# Patient Record
Sex: FEMALE | Race: WHITE
[De-identification: names, ages, dates, MRNs, and addresses within clinical notes are randomized per-mention and may not be internally consistent; named-entity substitution may affect disease eponyms.]

---

## 2018-10-13 ENCOUNTER — HOSPITAL ENCOUNTER (EMERGENCY)
Dept: HOSPITAL 62 - ER | Age: 23
LOS: 1 days | Discharge: PSYCHIATRIC OR SUBSTANCE USE FACILITY | End: 2018-10-14
Payer: OTHER GOVERNMENT

## 2018-10-13 DIAGNOSIS — Z87.891: ICD-10-CM

## 2018-10-13 DIAGNOSIS — R45.851: ICD-10-CM
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
## 2018-10-13 DIAGNOSIS — S51.811A: ICD-10-CM

## 2018-10-13 DIAGNOSIS — Z23: ICD-10-CM

## 2018-10-13 DIAGNOSIS — F32.9: Primary | ICD-10-CM

## 2018-10-13 LAB
ADD MANUAL DIFF: NO
ALBUMIN SERPL-MCNC: 5.2 G/DL (ref 3.5–5)
ALP SERPL-CCNC: 46 U/L (ref 38–126)
ALT SERPL-CCNC: 27 U/L (ref 9–52)
ANION GAP SERPL CALC-SCNC: 15 MMOL/L (ref 5–19)
APAP SERPL-MCNC: < 10 UG/ML (ref 10–30)
APPEARANCE UR: CLEAR
APTT PPP: COLORLESS S
AST SERPL-CCNC: 12 U/L (ref 14–36)
BARBITURATES UR QL SCN: NEGATIVE
BASOPHILS # BLD AUTO: 0 10^3/UL (ref 0–0.2)
BASOPHILS NFR BLD AUTO: 0.8 % (ref 0–2)
BILIRUB DIRECT SERPL-MCNC: 0.2 MG/DL (ref 0–0.4)
BILIRUB SERPL-MCNC: 0.6 MG/DL (ref 0.2–1.3)
BILIRUB UR QL STRIP: NEGATIVE
BUN SERPL-MCNC: 7 MG/DL (ref 7–20)
CALCIUM: 9.5 MG/DL (ref 8.4–10.2)
CHLORIDE SERPL-SCNC: 108 MMOL/L (ref 98–107)
CO2 SERPL-SCNC: 23 MMOL/L (ref 22–30)
EOSINOPHIL # BLD AUTO: 0.1 10^3/UL (ref 0–0.6)
EOSINOPHIL NFR BLD AUTO: 1.8 % (ref 0–6)
ERYTHROCYTE [DISTWIDTH] IN BLOOD BY AUTOMATED COUNT: 13.1 % (ref 11.5–14)
ETHANOL SERPL-MCNC: 212 MG/DL
GLUCOSE SERPL-MCNC: 103 MG/DL (ref 75–110)
GLUCOSE UR STRIP-MCNC: NEGATIVE MG/DL
HCT VFR BLD CALC: 40.8 % (ref 36–47)
HGB BLD-MCNC: 14 G/DL (ref 12–15.5)
KETONES UR STRIP-MCNC: NEGATIVE MG/DL
LYMPHOCYTES # BLD AUTO: 2.5 10^3/UL (ref 0.5–4.7)
LYMPHOCYTES NFR BLD AUTO: 40.2 % (ref 13–45)
MCH RBC QN AUTO: 30.4 PG (ref 27–33.4)
MCHC RBC AUTO-ENTMCNC: 34.3 G/DL (ref 32–36)
MCV RBC AUTO: 89 FL (ref 80–97)
METHADONE UR QL SCN: NEGATIVE
MONOCYTES # BLD AUTO: 0.4 10^3/UL (ref 0.1–1.4)
MONOCYTES NFR BLD AUTO: 6.8 % (ref 3–13)
NEUTROPHILS # BLD AUTO: 3.2 10^3/UL (ref 1.7–8.2)
NEUTS SEG NFR BLD AUTO: 50.4 % (ref 42–78)
NITRITE UR QL STRIP: NEGATIVE
PCP UR QL SCN: NEGATIVE
PH UR STRIP: 6 [PH] (ref 5–9)
PLATELET # BLD: 282 10^3/UL (ref 150–450)
POTASSIUM SERPL-SCNC: 4.2 MMOL/L (ref 3.6–5)
PROT SERPL-MCNC: 8.7 G/DL (ref 6.3–8.2)
PROT UR STRIP-MCNC: NEGATIVE MG/DL
RBC # BLD AUTO: 4.61 10^6/UL (ref 3.72–5.28)
SALICYLATES SERPL-MCNC: < 1 MG/DL (ref 2–20)
SODIUM SERPL-SCNC: 146.2 MMOL/L (ref 137–145)
SP GR UR STRIP: 1
TOTAL CELLS COUNTED % (AUTO): 100 %
URINE AMPHETAMINES SCREEN: NEGATIVE
URINE BENZODIAZEPINES SCREEN: NEGATIVE
URINE COCAINE SCREEN: NEGATIVE
URINE MARIJUANA (THC) SCREEN: NEGATIVE
UROBILINOGEN UR-MCNC: NEGATIVE MG/DL (ref ?–2)
WBC # BLD AUTO: 6.3 10^3/UL (ref 4–10.5)

## 2018-10-13 PROCEDURE — 99285 EMERGENCY DEPT VISIT HI MDM: CPT

## 2018-10-13 PROCEDURE — 85025 COMPLETE CBC W/AUTO DIFF WBC: CPT

## 2018-10-13 PROCEDURE — 36415 COLL VENOUS BLD VENIPUNCTURE: CPT

## 2018-10-13 PROCEDURE — 0HQDXZZ REPAIR RIGHT LOWER ARM SKIN, EXTERNAL APPROACH: ICD-10-PCS

## 2018-10-13 PROCEDURE — 81025 URINE PREGNANCY TEST: CPT

## 2018-10-13 PROCEDURE — 93010 ELECTROCARDIOGRAM REPORT: CPT

## 2018-10-13 PROCEDURE — 81001 URINALYSIS AUTO W/SCOPE: CPT

## 2018-10-13 PROCEDURE — 93005 ELECTROCARDIOGRAM TRACING: CPT

## 2018-10-13 PROCEDURE — 80053 COMPREHEN METABOLIC PANEL: CPT

## 2018-10-13 PROCEDURE — 90715 TDAP VACCINE 7 YRS/> IM: CPT

## 2018-10-13 PROCEDURE — 12002 RPR S/N/AX/GEN/TRNK2.6-7.5CM: CPT

## 2018-10-13 PROCEDURE — 80307 DRUG TEST PRSMV CHEM ANLYZR: CPT

## 2018-10-13 NOTE — ER DOCUMENT REPORT
Doctor's Note


Notes: 





10/13/18 09:56


23-year-old female presenting with suicidal ideations and cutting her wrist.  

Patient has stress secondary to going through a divorce.  Patient was 

[facility].  Patient has no 

psychiatrist and does not have any formal psychiatric diagnosis according to 

the patient's report.  Vital signs are stable.  Labs as recorded.  Awaiting 

psychiatry evaluation.





10/13/18 10:12


I have added a pregnancy test and have had a long discussion with the patient 

and the friend in the room.  Patient does feel comfortable going home as she 

states she does have a psychology appointment on Wednesday but still does not 

trust herself feeling safe to not inflict self-harm.  She denies any auditory 

visual hallucinations.  Labs as recorded.  Vital signs are stable.

## 2018-10-13 NOTE — ER DOCUMENT REPORT
ED General





- General


Chief Complaint: Suicidal Ideation


Stated Complaint: SUICIDAL IDEATION


Time Seen by Provider: 10/13/18 01:02


Mode of Arrival: Ambulatory


TRAVEL OUTSIDE OF THE [place] IN LAST 30 DAYS: No





- HPI


Patient complains to provider of: self injury


Onset: Other - This is a 23-year-old female that presents for evaluation of 

depression as well as her right wrist.  She notes that she has been wanting to 

harm herself recently because she is going through divorce and feeling very 

stressed.  She has had an attempt to harm herself in the past when she was 

stressed. Is not currently on any medications is never been done before, was 

seen a week prior and told that she should pursue outpatient treatment was 

discharged from [facility]. She denies any desire to harm anyone else, any 

hallucinations.





- Related Data


Allergies/Adverse Reactions: 


 





No Known Allergies Allergy (Unverified 10/13/18 01:10)


 











Past Medical History





- General


Information source: Patient





- Social History


Smoking Status: Former Smoker


Family History: None


Psychiatric Medical History: Reports: Hx Depression





Review of Systems





- Review of Systems


-: Yes All other systems reviewed and negative





Physical Exam





- Vital signs


Vitals: 


 











Pulse


 


 99 


 


 10/13/18 01:10














- General


General appearance: Appears well


In distress: None





- HEENT


Head: Normocephalic


Eyes: Normal


Conjunctiva: Normal


Cornea: Normal


Extraocular movements intact: Yes


Eyelashes: Normal


Pupils: PERRL





- Respiratory


Respiratory status: No respiratory distress


Chest status: Nontender


Breath sounds: Normal


Chest palpation: Normal





- Cardiovascular


Rhythm: Regular


Heart sounds: Normal auscultation


Murmur: No





- Abdominal


Inspection: Normal


Distension: No distension


Tenderness: Nontender





- Back


Back: Normal





- Extremities


General lower extremity: Normal inspection, Nontender, Normal ROM, Normal 

strength


Arm: Other - The upper extremities are symmetric, there is normal range of 

motion at the shoulders, elbows, wrists. There is a linear horizontal 

laceration extending against the dorsal aspect of the right forearm into the 

subcutaneous tissue, no appreciable underlying tendon injury, no appreciable 

muscular involvement





- Neurological


Neuro grossly intact: Yes


Cognition: Normal


Orientation: AAOx4


Burney Coma Scale Eye Opening: Spontaneous


Bebo Coma Scale Verbal: Oriented


Bebo Coma Scale Motor: Obeys Commands


Burney Coma Scale Total: 15


Speech: Normal


Cranial nerves: Normal


Cerebellar coordination: Normal


Motor strength normal: LUE, RUE, LLE, RLE





- Psychological


Associated symptoms: Normal affect





Course





- Re-evaluation


Re-evalutation: 





10/13/18 06:26


23-year-old female who cut her right wrist this evening in an attempt to harm 

herself.


Has a history of depression which is been untreated in the past, notes that she 

is got increased stress as a relation of going through divorce.  Denies any 

medical problems, any other health problems, any medication use drug use or 

otherwise.


Patient is a 4 cm laceration over the right forearm, will primarily repair 

this.  Will update patient's tetanus.  Will contact psychiatry for evaluation 

of this patient.


We will defer disposition determination through mental health services.


At this time the patient has been medically cleared, she is clinically sober.





- Vital Signs


Vital signs: 


 











Temp Pulse Resp BP Pulse Ox


 


    99          


 


    10/13/18 01:10         














- Laboratory


Result Diagrams: 


 10/13/18 01:35





 10/13/18 01:35


Laboratory results interpreted by me: 


 











  10/13/18





  01:35


 


Sodium  146.2 H


 


Chloride  108 H


 


AST  12 L


 


Total Protein  8.7 H


 


Albumin  5.2 H


 


Salicylates  < 1.0 L


 


Acetaminophen  < 10 L














Procedures





- Laceration/Wound Repair


  ** Right Volar Arm


Wound length (cm): 4


Wound's Depth, Shape: Superficial


Laceration pre-procedure: Sterile PPE donned


Anesthetic type: 1% Lidocaine


Volume Anesthetic (mLs): 5


Wound explored: Clean


Irrigated w/ Saline (mLs): 500


Wound Debrided: Minimal


Wound Repaired With: Sutures


Suture Size/Type: 4:0, Prolene


Number of Sutures: 3


Layer Closure?: No





Discharge





- Discharge


Clinical Impression: 


 Self-injurious behavior

## 2018-10-13 NOTE — PSYCHOLOGICAL NOTE
Psych Note





- Psych Note


Psych Note: 


Reason for consult: suicidal ideation, depression


Consent for permissions: Aminata, personal friend [phone]





Pt arrives to ED with c/o depression. Pt has cut her right forearm prior to 

arrival. Pt was seen at [facility] and d/c home yesterday for outpatient 

care. Pt has appointment with [facility] for Wed. Pt states 

 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 No specific incident has 

occured to cause her to harm herself. Pt reports history of same. 





Patient states that she "self inflicted" a wound to her right arm last night. 

Patient disclosed that she does not feel sad but impulsive and that is why she 

chose to cut her self. Patient states that she was not trying to kill her self 

but just release the anger. Patient says that "she just got the thought in her 

head" and cut herself. When asked why she does not want to kill herself by this 

Clinician, patient reported "I couldn't do that to my parents". Patient also 

disclosed that she does not have access to weapons in the home. Patient was 

discharged from [facility] last night and has an appointment on Wednesday 

at [facility]. Patient is requesting inpatient 

hospitalization because she knows that she will not follow through on her own 

if it is "just" outpatient therapy. Patient reports that she was also 

hospitalized at the [facility] overnight in July for an incident of cutting 

but was released. Patient reports that she holds down a full time job at 

[employer] and is a social drinker. However, patient discloses that she was 

drinking during this incident. Patient is not sure how she feels about the 

divorce at this time.





Aminata, the patient's friend, reports that her and the patient met up at [employer] for drinks and dinner. Aminata states that the patient also 

attended a wine & design class before coming to meet her for drinks, so patient 

may have already had some alcohol in her system.  After awhile, Aminata and the 

patient split up and went their separate ways for the evening. Aminata states 

that she was home for about an hour when she got a call that the patient was in 

crisis. Aminata states that she called 911 and rode in the ambulance with the 

client. Aminata reports that the patient gets very depressed when she is drinking 

but can function fine when there is no alcohol involved. Just the other night, 

Aminata states that patient came to her house to watch movies and no alcohol was 

involved. Aminata does remember a conversation about one month ago, where patient 

exclaimed "I don't know why I am here" during a conversation but would not 

elaborate. Aminata messaged the patient's  overseas and he confirmed that 

there was a gun in the house under a padlock and that the patient did not have 

the combination to open it. 





Patient is alert and oriented to person, place, time and circumstance. Mood is 

dysphoric with a tearful affect. Patient denied suicidal ideation to this 

clinician during the assessment but then could not rule it out when she spoke 

to the doctor. Conversational speech was within normal rate, tone and prosody. 

Intellectual abilities appear to be within the average range. Attention and 

Concentration are fair. Insight, judgment and impulse control are fair. Eye 

contact was well maintained. Organized and linear thought process present 

during assessment.





No medication recommendations at this time





Diagnosis


301.83 (F60.3) Borderline Personality Disorder





Impression/Plan: Patient is recommended for IVC. Patient is experiencing 

increased depression and self harm behaviors. Patient has been accepted into 

[facility] and will be transported today. Patient disclosed to the 

physician that she did not feel safe going home and could not rule out active 

suicidal ideation. Patient's  is deployed and she disclosed that she was 

going through a divorce. Patient's mood is dysphoric  with a tearful affect. 

Patient is slow to engage the Clinician at this time. Dr. Anaya was consulted 

on the care and management of this patient; attending physician is in agreement 

with recommendations and disposition.

## 2018-10-14 VITALS — DIASTOLIC BLOOD PRESSURE: 77 MMHG | SYSTOLIC BLOOD PRESSURE: 115 MMHG

## 2019-01-23 ENCOUNTER — HOSPITAL ENCOUNTER (INPATIENT)
Dept: HOSPITAL 62 - ER | Age: 24
LOS: 3 days | Discharge: HOME | DRG: 918 | End: 2019-01-26
Attending: FAMILY MEDICINE | Admitting: FAMILY MEDICINE
Payer: COMMERCIAL
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
## 2019-01-23 DIAGNOSIS — R51: ICD-10-CM

## 2019-01-23 DIAGNOSIS — R00.0: ICD-10-CM

## 2019-01-23 DIAGNOSIS — T43.292A: Primary | ICD-10-CM

## 2019-01-23 DIAGNOSIS — F32.9: ICD-10-CM

## 2019-01-23 DIAGNOSIS — Z72.89: ICD-10-CM

## 2019-01-23 LAB
ADD MANUAL DIFF: NO
ALBUMIN SERPL-MCNC: 4.7 G/DL (ref 3.5–5)
ALP SERPL-CCNC: 62 U/L (ref 38–126)
ALT SERPL-CCNC: 24 U/L (ref 9–52)
ANION GAP SERPL CALC-SCNC: 12 MMOL/L (ref 5–19)
APAP SERPL-MCNC: < 10 UG/ML (ref 10–30)
APPEARANCE UR: CLEAR
APTT PPP: (no result) S
AST SERPL-CCNC: 12 U/L (ref 14–36)
BARBITURATES UR QL SCN: NEGATIVE
BASOPHILS # BLD AUTO: 0.1 10^3/UL (ref 0–0.2)
BASOPHILS NFR BLD AUTO: 0.8 % (ref 0–2)
BILIRUB DIRECT SERPL-MCNC: 0.1 MG/DL (ref 0–0.4)
BILIRUB SERPL-MCNC: 0.3 MG/DL (ref 0.2–1.3)
BILIRUB UR QL STRIP: NEGATIVE
BUN SERPL-MCNC: 12 MG/DL (ref 7–20)
CALCIUM: 8.9 MG/DL (ref 8.4–10.2)
CHLORIDE SERPL-SCNC: 112 MMOL/L (ref 98–107)
CO2 SERPL-SCNC: 21 MMOL/L (ref 22–30)
EOSINOPHIL # BLD AUTO: 0.3 10^3/UL (ref 0–0.6)
EOSINOPHIL NFR BLD AUTO: 4.1 % (ref 0–6)
ERYTHROCYTE [DISTWIDTH] IN BLOOD BY AUTOMATED COUNT: 12.6 % (ref 11.5–14)
ETHANOL SERPL-MCNC: 248 MG/DL
GLUCOSE SERPL-MCNC: 156 MG/DL (ref 75–110)
GLUCOSE UR STRIP-MCNC: NEGATIVE MG/DL
HCT VFR BLD CALC: 40 % (ref 36–47)
HGB BLD-MCNC: 13.3 G/DL (ref 12–15.5)
KETONES UR STRIP-MCNC: NEGATIVE MG/DL
LYMPHOCYTES # BLD AUTO: 2.5 10^3/UL (ref 0.5–4.7)
LYMPHOCYTES NFR BLD AUTO: 38.1 % (ref 13–45)
MCH RBC QN AUTO: 29.7 PG (ref 27–33.4)
MCHC RBC AUTO-ENTMCNC: 33.3 G/DL (ref 32–36)
MCV RBC AUTO: 89 FL (ref 80–97)
METHADONE UR QL SCN: NEGATIVE
MONOCYTES # BLD AUTO: 0.3 10^3/UL (ref 0.1–1.4)
MONOCYTES NFR BLD AUTO: 5.2 % (ref 3–13)
NEUTROPHILS # BLD AUTO: 3.3 10^3/UL (ref 1.7–8.2)
NEUTS SEG NFR BLD AUTO: 51.8 % (ref 42–78)
NITRITE UR QL STRIP: NEGATIVE
PCP UR QL SCN: NEGATIVE
PH UR STRIP: 5 [PH] (ref 5–9)
PLATELET # BLD: 245 10^3/UL (ref 150–450)
POTASSIUM SERPL-SCNC: 3.8 MMOL/L (ref 3.6–5)
PROT SERPL-MCNC: 7.4 G/DL (ref 6.3–8.2)
PROT UR STRIP-MCNC: NEGATIVE MG/DL
RBC # BLD AUTO: 4.48 10^6/UL (ref 3.72–5.28)
SALICYLATES SERPL-MCNC: < 1 MG/DL (ref 2–20)
SODIUM SERPL-SCNC: 144.6 MMOL/L (ref 137–145)
SP GR UR STRIP: 1.01
TOTAL CELLS COUNTED % (AUTO): 100 %
URINE AMPHETAMINES SCREEN: NEGATIVE
URINE BENZODIAZEPINES SCREEN: NEGATIVE
URINE COCAINE SCREEN: NEGATIVE
URINE MARIJUANA (THC) SCREEN: NEGATIVE
UROBILINOGEN UR-MCNC: NEGATIVE MG/DL (ref ?–2)
WBC # BLD AUTO: 6.5 10^3/UL (ref 4–10.5)

## 2019-01-23 PROCEDURE — 81001 URINALYSIS AUTO W/SCOPE: CPT

## 2019-01-23 PROCEDURE — 36415 COLL VENOUS BLD VENIPUNCTURE: CPT

## 2019-01-23 PROCEDURE — 80061 LIPID PANEL: CPT

## 2019-01-23 PROCEDURE — 84443 ASSAY THYROID STIM HORMONE: CPT

## 2019-01-23 PROCEDURE — 93005 ELECTROCARDIOGRAM TRACING: CPT

## 2019-01-23 PROCEDURE — 83735 ASSAY OF MAGNESIUM: CPT

## 2019-01-23 PROCEDURE — 85025 COMPLETE CBC W/AUTO DIFF WBC: CPT

## 2019-01-23 PROCEDURE — 99285 EMERGENCY DEPT VISIT HI MDM: CPT

## 2019-01-23 PROCEDURE — 80307 DRUG TEST PRSMV CHEM ANLYZR: CPT

## 2019-01-23 PROCEDURE — 84481 FREE ASSAY (FT-3): CPT

## 2019-01-23 PROCEDURE — 80048 BASIC METABOLIC PNL TOTAL CA: CPT

## 2019-01-23 PROCEDURE — S0119 ONDANSETRON 4 MG: HCPCS

## 2019-01-23 PROCEDURE — 93010 ELECTROCARDIOGRAM REPORT: CPT

## 2019-01-23 PROCEDURE — 84703 CHORIONIC GONADOTROPIN ASSAY: CPT

## 2019-01-23 PROCEDURE — 84439 ASSAY OF FREE THYROXINE: CPT

## 2019-01-23 PROCEDURE — 80053 COMPREHEN METABOLIC PANEL: CPT

## 2019-01-23 RX ADMIN — Medication SCH: at 15:50

## 2019-01-23 RX ADMIN — ACETAMINOPHEN PRN MG: 325 TABLET ORAL at 15:49

## 2019-01-23 RX ADMIN — ACETAMINOPHEN PRN MG: 325 TABLET ORAL at 09:49

## 2019-01-23 RX ADMIN — FAMOTIDINE SCH MG: 20 TABLET, FILM COATED ORAL at 22:42

## 2019-01-23 RX ADMIN — FAMOTIDINE SCH: 20 TABLET, FILM COATED ORAL at 09:52

## 2019-01-23 RX ADMIN — Medication SCH ML: at 22:45

## 2019-01-23 RX ADMIN — ENOXAPARIN SODIUM SCH: 40 INJECTION SUBCUTANEOUS at 09:52

## 2019-01-23 NOTE — PSYCHOLOGICAL NOTE
Psych Note





- Psych Note


Date seen by psych provider: 01/23/19


Time seen by psych provider: 07:45


Psych Note: 


Reason for Consult: intentional overdose





QUIRINO SCHROEDER is a 23 year old female who presented to the emergency room within

an hour of taking 30 capsules of bupropion 150 mg at home.  Patient discloses 

that she intentionally overdosed on her Wellbutrin when asked why she reports "I

have no idea."  She states that she does have some stress from work however den

ies significant stress or new stressors.  When asked about her relationship with

her  (clinician notes patient was last seen in October which she 

disclosed that she was going through divorce) patient reports that her  

and her have gotten back together.  She confirms that her  is still 

deployed and will be coming home next month.  Patient's  has been 

deployed since September.  She reports that she is been getting her medications 

from [facility] and receives therapeutic services with [facility].  Patient does confirm

she was drinking last night.  When asked about diagnoses she reports that she 

knows that she has major depressive disorder however reports that lately there 

has been some discussion on "stuff that I have been able to get past for my 

childhood."





Patient is alert and orientated to person, place, time and circumstance.  Mood 

is flat with flat affect.  Clinician notes patient is currently very nauseated 

and feeling ill due to the overdose.  Patient endorses intentional overdose with

intent to harm herself.  Patient denies homicidal ideation.  Delusions are 

absent behaviors congruent with an intact reality based presentation i.e. 

organized and linear thought process.  Eye contact is poor.  Conversational 

speech is within normal rate, tone and prosody.  Intellectual abilities appear 

to be within average range.  Attention and concentration is poor.  Insight, 

judgment, impulse control is poor.





No medication recommendations at this time





Diagnosis


296.30 (F33.9) Major depressive disorder per history report by patient


291.9 (F10.99) Unspecified substance abuse;alcohol





Impression/Plan: Patient is recommended for IVC.  Patient reports intentional 

overdose on her Welburtrin.  She is unable or unwilling to discuss events 

leading up to overdose reporting; " I have no idea."  She reports it was an 

impulsive act with no true trigger.  Patient will be reevaluated.  Dr. Anaya 

was consulted and care management this patient; attending physicians in ag

reement with recommendations and disposition.

## 2019-01-23 NOTE — EKG REPORT
SEVERITY:- BORDERLINE ECG -

SINUS RHYTHM

CONSIDER RIGHT VENTRICULAR HYPERTROPHY

:

Confirmed by: Kristal Marie MD 23-Jan-2019 07:24:59

## 2019-01-23 NOTE — EKG REPORT
SEVERITY:- ABNORMAL ECG -

SINUS TACHYCARDIA

NONSPECIFIC REPOL ABNORMALITY, DIFFUSE LEADS

:

Confirmed by: Kristal Marie MD 23-Jan-2019 07:25:24

## 2019-01-23 NOTE — PDOC PROGRESS REPORT
Subjective


Progress Note for:: 01/23/19


Subjective:: 





1/23/2019-no acute events since admission.  Patient is complaining of nausea she

threw up p.o. Zofran then she was given IV Zofran and nausea was resolving.  No 

complaints from the patient.  Patient was started on Nubain every 3 hours for 

headaches.  Plan to discontinue Nubain started on Percocet 2 tablets every 4 as 

needed.


Reason For Visit: 


BUPROPION OVERDOSE, IVC PAPERS COMPLETED








Physical Exam


Vital Signs: 


                                        











Temp Pulse Resp BP Pulse Ox


 


 98.4 F      16   103/67   99 


 


 01/23/19 06:00     01/23/19 07:00  01/23/19 07:00  01/23/19 07:00








                                 Intake & Output











 01/22/19 01/23/19 01/24/19





 06:59 06:59 06:59


 


Weight   56.245 kg











General appearance: PRESENT: no acute distress


Head exam: PRESENT: atraumatic


Eye exam: PRESENT: PERRLA


Neck exam: ABSENT: carotid bruit, JVD, lymphadenopathy, thyromegaly


Respiratory exam: PRESENT: decreased breath sounds


Cardiovascular exam: PRESENT: tachycardia


GI/Abdominal exam: PRESENT: normal bowel sounds, soft.  ABSENT: distended, 

guarding, mass, organolmegaly, rebound, tenderness


Extremities exam: PRESENT: full ROM.  ABSENT: calf tenderness, clubbing, pedal 

edema


Neurological exam: PRESENT: alert, awake, oriented to person, oriented to place,

oriented to time, oriented to situation, CN II-XII grossly intact.  ABSENT: 

motor sensory deficit


Psychiatric exam: PRESENT: appropriate affect, normal mood.  ABSENT: homicidal 

ideation, suicidal ideation





Results


Laboratory Results: 


                                        





                                 01/23/19 01:02 





                                 01/23/19 01:02 





                                        











  01/23/19 01/23/19 01/23/19





  01:02 01:02 01:02


 


WBC  6.5  


 


RBC  4.48  


 


Hgb  13.3  


 


Hct  40.0  


 


MCV  89  


 


MCH  29.7  


 


MCHC  33.3  


 


RDW  12.6  


 


Plt Count  245  


 


Seg Neutrophils %  51.8  


 


Lymphocytes %  38.1  


 


Monocytes %  5.2  


 


Eosinophils %  4.1  


 


Basophils %  0.8  


 


Absolute Neutrophils  3.3  


 


Absolute Lymphocytes  2.5  


 


Absolute Monocytes  0.3  


 


Absolute Eosinophils  0.3  


 


Absolute Basophils  0.1  


 


Sodium   144.6 


 


Potassium   3.8 


 


Chloride   112 H 


 


Carbon Dioxide   21 L 


 


Anion Gap   12 


 


BUN   12 


 


Creatinine   0.84 


 


Est GFR ( Amer)   > 60 


 


Est GFR (Non-Af Amer)   > 60 


 


Glucose   156 H 


 


Calcium   8.9 


 


Total Bilirubin   0.3 


 


AST   12 L 


 


ALT   24 


 


Alkaline Phosphatase   62 


 


Total Protein   7.4 


 


Albumin   4.7 


 


Serum HCG, Qual    NEGATIVE


 


Urine Color   


 


Urine Appearance   


 


Urine pH   


 


Ur Specific Gravity   


 


Urine Protein   


 


Urine Glucose (UA)   


 


Urine Ketones   


 


Urine Blood   


 


Urine Nitrite   


 


Ur Leukocyte Esterase   


 


Urine WBC (Auto)   


 


Urine RBC (Auto)   














  01/23/19





  07:30


 


WBC 


 


RBC 


 


Hgb 


 


Hct 


 


MCV 


 


MCH 


 


MCHC 


 


RDW 


 


Plt Count 


 


Seg Neutrophils % 


 


Lymphocytes % 


 


Monocytes % 


 


Eosinophils % 


 


Basophils % 


 


Absolute Neutrophils 


 


Absolute Lymphocytes 


 


Absolute Monocytes 


 


Absolute Eosinophils 


 


Absolute Basophils 


 


Sodium 


 


Potassium 


 


Chloride 


 


Carbon Dioxide 


 


Anion Gap 


 


BUN 


 


Creatinine 


 


Est GFR ( Amer) 


 


Est GFR (Non-Af Amer) 


 


Glucose 


 


Calcium 


 


Total Bilirubin 


 


AST 


 


ALT 


 


Alkaline Phosphatase 


 


Total Protein 


 


Albumin 


 


Serum HCG, Qual 


 


Urine Color  STRAW


 


Urine Appearance  CLEAR


 


Urine pH  5.0


 


Ur Specific Gravity  1.012


 


Urine Protein  NEGATIVE


 


Urine Glucose (UA)  NEGATIVE


 


Urine Ketones  NEGATIVE


 


Urine Blood  NEGATIVE


 


Urine Nitrite  NEGATIVE


 


Ur Leukocyte Esterase  NEGATIVE


 


Urine WBC (Auto)  1


 


Urine RBC (Auto)  0














Assessment & Plan





- Diagnosis


(1) Depression


Qualifiers: 


   Depression Type: unspecified   Qualified Code(s): F32.9 - Major depressive 

disorder, single episode, unspecified   


Is this a current diagnosis for this admission?: Yes   


Plan: 


1/23/2019-patient was admitted with a depression with intentional intake of 30 

tablets of Wellbutrin.  She was under IVC.  A consult was requested.








(2) Suicidal ideation


Is this a current diagnosis for this admission?: Yes   


Plan: 


1/23/2019 patient was admitted after intentional intake of 30 tablets of 

Wellbutrin.  Psych consult was requested, involuntary commitment papers were 

done.








(3) Intentional overdose of drug in tablet form


Is this a current diagnosis for this admission?: Yes   


Plan: 


1/23/2019-patient took 30 tablets of Wellbutrin intentionally.  Psych consult 

was requested their input is will be appreciated.  Patient is under IVC.








- Time


Time Spent with patient: 15-24 minutes


Medications reviewed and adjusted accordingly: Yes


Anticipated discharge: Home

## 2019-01-23 NOTE — ER DOCUMENT REPORT
ED Substance Abuse / Acc. OD





- General


Chief Complaint: Overdose


Stated Complaint: POSSIBLE OVERDOSE


Time Seen by Provider: 01/23/19 01:02


Mode of Arrival: Stretcher


Information source: Patient


Notes: 





Patient is a 23-year-old female with a past history of chronic depression who 

presents with intentional overdose just prior to arrival.  Patient reports 

feeling suicidal, therefore she took an entire bottle (30) of 150 mg Wellbutrin.

 She currently reports feeling increased depression but is unwilling to give 

details, however she does deny any issues with family or friends that 

precipitated the episode.  She currently denies confusion, weakness, nausea, 

vomiting, vision changes, or any other symptom.


TRAVEL OUTSIDE OF THE [place] IN LAST 30 DAYS: No





- HPI


Patient complains to provider of: Other - Intentional drug overdose


Onset: Just prior to arrival


Onset/Duration: Sudden


Quality of pain: No pain


Severity: None


Pain Level: Denies


Situational problems related to: Spouse


Overdose of: Anti-depressants


Associated Symptoms: None


Similar symptoms previously: No


Recently seen / treated by doctor: No





- Related Data


Allergies/Adverse Reactions: 


                                        





No Known Allergies Allergy (Unverified 10/13/18 01:10)


   











Past Medical History





- General


Information source: Patient, Emergency Med Personnel





- Social History


Smoking Status: Never Smoker


Chew tobacco use (# tins/day): No


Frequency of alcohol use: None


Drug Abuse: None


Lives with: Family


Family History: None


Patient has suicidal ideation: Yes


Patient has homicidal ideation: No





- Past Medical History


Cardiac Medical History: Reports: None


Pulmonary Medical History: Reports: None


EENT Medical History: Reports: None


Neurological Medical History: Reports: None


Endocrine Medical History: Reports: None


Renal/ Medical History: Reports: None


Malignancy Medical History: Reports: None


GI Medical History: Reports: None


Musculoskeletal Medical History: Reports None


Skin Medical History: Reports None


Psychiatric Medical History: Reports: Hx Depression


Traumatic Medical History: Reports: None


Infectious Medical History: Reports: None


Surgical Hx: Negative


Past Surgical History: Reports: None





- Immunizations


Immunizations up to date: Yes


Hx Diphtheria, Pertussis, Tetanus Vaccination: Yes





Review of Systems





- Review of Systems


Constitutional: No symptoms reported


EENT: No symptoms reported


Cardiovascular: No symptoms reported


Respiratory: No symptoms reported


Gastrointestinal: No symptoms reported


Genitourinary: No symptoms reported


Female Genitourinary: No symptoms reported


Musculoskeletal: No symptoms reported


Skin: No symptoms reported


Hematologic/Lymphatic: No symptoms reported


Neurological/Psychological: See HPI, Depression, Suicidal ideation


-: Yes All other systems reviewed and negative





Physical Exam





- Vital signs


Vitals: 


                                        











Temp Resp Pulse Ox


 


 98.7 F   20   99 


 


 01/23/19 00:50  01/23/19 00:50  01/23/19 00:50











Interpretation: Normal





- Notes


Notes: 





Patient is visibly upset and depressed appearing but in no acute distress





- General


General appearance: Appears well, Alert





- HEENT


Head: Normocephalic, Atraumatic


Eyes: Normal


Pupils: PERRL





- Respiratory


Respiratory status: No respiratory distress


Chest status: Nontender


Breath sounds: Normal


Chest palpation: Normal





- Cardiovascular


Rhythm: Regular


Heart sounds: Normal auscultation


Murmur: No





- Abdominal


Inspection: Normal


Distension: No distension


Bowel sounds: Normal


Tenderness: Nontender


Organomegaly: No organomegaly





- Rectal


Notes: 


Deferred





- Genitourinary


Notes: 





Deferred





- Back


Back: Normal, Nontender





- Extremities


General upper extremity: Normal inspection, Nontender, Normal color, Normal ROM,

Normal temperature


General lower extremity: Normal inspection, Nontender, Normal color, Normal ROM,

Normal temperature, Normal weight bearing.  No: Charly's sign





- Neurological


Neuro grossly intact: Yes


Cognition: Normal


Orientation: AAOx4


Spickard Coma Scale Eye Opening: Spontaneous


Bebo Coma Scale Verbal: Oriented


Spickard Coma Scale Motor: Obeys Commands


Spickard Coma Scale Total: 15


Speech: Normal


Motor strength normal: LUE, RUE, LLE, RLE


Sensory: Normal





- Psychological


Associated symptoms: Anxious, Depressed





- Skin


Skin Temperature: Warm


Skin Moisture: Dry


Skin Color: Normal





Course





- Re-evaluation


Re-evalutation: 





01/23/19 01:38


Patient clearly intended to overdose.  Currently is stable but upset.  

Consultation with the [facility] revealed to admit the patient for a 

24-hour observation to assess for seizure activity as well as prolonged QT 

intervals on EKG.  Will obtain labs, involuntarily commit the patient, and 

admit.





01/23/19 04:53


Patient continues to be stable, labs are unremarkable.





- Vital Signs


Vital signs: 


                                        











Temp Pulse Resp BP Pulse Ox


 


 98.7 F      18   94/56 L  97 


 


 01/23/19 00:50     01/23/19 02:01  01/23/19 02:01  01/23/19 02:01














- Laboratory


Result Diagrams: 


                                 01/23/19 01:02





                                 01/23/19 01:02


Laboratory results interpreted by me: 


                                        











  01/23/19





  01:02


 


Chloride  112 H


 


Carbon Dioxide  21 L


 


Glucose  156 H


 


AST  12 L


 


Salicylates  < 1.0 L


 


Acetaminophen  < 10 L














- EKG Interpretation by Me


EKG shows normal: Sinus rhythm


Rate: Normal


Rhythm: NSR


Axis/QRS: No: Right axis deviation, Left axis deviation, RBBB, LBBB, IVCD, 

LAHB/LAFB, LPHB/LPFB, Bifasicular block


Voltage: No: Increased voltage, Consistant with LVH, Decreased voltage, 

Throughout, Limb leads


Heart block present: No: 1st Degree, Mobitz 1, Mobitz 2, CHB (3rd degree block)


When compared to previous EKG there are: No significant change





- Consults


  ** Dr. Weiland


Time consulted: 04:54 - will admit


Consulted provider: will come to ER





Discharge





- Discharge


Clinical Impression: 


 Intentional overdose of drug in tablet form





Condition: Stable


Disposition: ADMITTED AS INPATIENT


Admitting Provider: Hospitalist


Unit Admitted: Telemetry

## 2019-01-23 NOTE — PDOC H&P
History of Present Illness


Admission Date/PCP: 


  01/23/19 04:56





  





Patient complains of: Bupropion overdose


History of Present Illness: 


QUIRINO SCHROEDER is a 23 year old female who presented to the emergency room within

an hour of taking 30 capsules of bupropion 150 mg at home.  Patient states that 

the affect was more 1 of an impulse than of a thought out plan to actually kill 

herself.  The impulse was definitely suicidal but she very quickly changed her 

mind after taking the pills.  She denies any symptoms after ingestion with the 

exception of drowsiness and a moderately severe bifrontal headache.  In the 

emergency room she was found to have essentially negative evaluation and was 

subsequently admitted to observation status for telemetry monitoring for arrhyth

mias and serial monitoring of her vital signs.  Involuntary commitment papers 

have been signed and the patient will most likely be seen by a inpatient 

psychiatric facility after discharge/transfer if advised by psychiatric 

services.





Past Medical History


Cardiac Medical History: 


   Denies: Coronary Artery Disease, DVT, Hyperlipidema, Hypertension, Pulmonary 

Embolism


Pulmonary Medical History: 


   Denies: Asthma, Bronchitis, Pneumonia


EENT Medical History: Reports: None


Neurological Medical History: 


   Denies: Multiple Sclerosis, Seizures


Endocrine Medical History: 


   Denies: Diabetes Mellitus Type 1, Hyperthyroidism, Hypothyroidism


Renal/ Medical History: 


   Denies: Chronic Kidney Disease, Nephrolithiasis


Malignancy Medical History: Reports: None


GI Medical History: 


   Denies: Cirrhosis, Crohn's Disease, Hepatitis, Ulcerative Colitis


Musculoskeltal Medical History: 


   Denies: Arthritis, Fibromyalgia


Skin Medical History: 


   Denies: Eczema, Psoriasis


Psychiatric Medical History: Reports: Depression


   Denies: Alcohol Dependency, Substance Abuse, Tobacco Dependency


Traumatic Medical History: Reports: None


Hematology: 


   Denies: Anemia, Bleeding Tendencies


Infectious Medical History: Reports: None





Past Surgical History


Past Surgical History: Reports: None





Social History


Information Source: Patient


Lives with: Family


Smoking Status: Never Smoker


Frequency of Alcohol Use: None


Hx Recreational Drug Use: No


Drugs: None


Hx Prescription Drug Abuse: No





- Advance Directive


Resuscitation Status: Full Code


Surrogate healthcare decision maker:: 


Aminata Alfaro





Family History


Family History: denies: CAD, DM, Hypertension, Malignancy


Parental Family History Reviewed: Yes


Children Family History Reviewed: No


Sibling(s) Family History Reviewed.: Yes





Medication/Allergy


Home Medications: 








No Home Medications  10/13/18 








Allergies/Adverse Reactions: 


                                        





No Known Allergies Allergy (Unverified 10/13/18 01:10)


   











Review of Systems


Constitutional: PRESENT: as per HPI, headache(s), other - Somnolence.  ABSENT: 

anorexia, fever(s)


Eyes: ABSENT: visual disturbances, other - Ocular pain


Ears: ABSENT: hearing changes, other - Ear pain


Nose, Mouth, and Throat: ABSENT: mouth pain, sore throat


Cardiovascular: ABSENT: chest pain, dyspnea on exertion, edema, orthropnea, 

palpitations


Respiratory: ABSENT: cough, dyspnea


Gastrointestinal: ABSENT: abdominal pain, constipation, diarrhea, nausea, 

vomiting


Genitourinary: ABSENT: dysuria, hematuria


Musculoskeletal: ABSENT: deformity, joint swelling


Integumentary: ABSENT: pruritus, rash


Neurological: ABSENT: confusion, convulsions, memory loss, tremor(s)


Psychiatric: PRESENT: depression, suicidal ideation.  ABSENT: anxiety


Endocrine: ABSENT: cold intolerance, heat intolerance


Hematologic/Lymphatic: ABSENT: easy bleeding, easy bruising





Physical Exam


Vital Signs: 


                                        











Temp Pulse Resp BP Pulse Ox


 


 98.7 F      17   99/70 L  97 


 


 01/23/19 00:50     01/23/19 05:36  01/23/19 05:36  01/23/19 05:36











General appearance: PRESENT: no acute distress, cooperative


Head exam: PRESENT: atraumatic, normocephalic


Eye exam: PRESENT: conjunctiva pink, EOMI.  ABSENT: scleral icterus


Ear exam: PRESENT: normal external ear exam.  ABSENT: bleeding, drainage


Mouth exam: PRESENT: dry mucosa, neck supple


Neck exam: ABSENT: JVD, thyromegaly, tracheal deviation


Respiratory exam: PRESENT: clear to auscultation milla, symmetrical, unlabored


Cardiovascular exam: PRESENT: RRR.  ABSENT: clicks, diastolic murmur, gallop, 

rubs, systolic murmur


Pulses: PRESENT: normal radial pulses, normal dorsalis pedis pul


Vascular exam: PRESENT: normal capillary refill.  ABSENT: pallor


GI/Abdominal exam: PRESENT: normal bowel sounds, soft


Rectal exam: PRESENT: deferred


Extremities exam: ABSENT: joint swelling, pedal edema, tenderness


Musculoskeletal exam: PRESENT: full ROM, normal inspection


Neurological exam: PRESENT: alert, oriented to person, oriented to place, 

oriented to time, oriented to situation, CN II-XII grossly intact.  ABSENT: 

motor sensory deficit


Psychiatric exam: PRESENT: appropriate affect, depressed, suicidal ideation


Skin exam: PRESENT: dry, intact, warm.  ABSENT: jaundice, rash, urticaria





Results


Laboratory Results: 


                                        





                                 01/23/19 01:02 





                                 01/23/19 01:02 





                                        











  01/23/19 01/23/19 01/23/19





  01:02 01:02 01:02


 


WBC  6.5  


 


RBC  4.48  


 


Hgb  13.3  


 


Hct  40.0  


 


MCV  89  


 


MCH  29.7  


 


MCHC  33.3  


 


RDW  12.6  


 


Plt Count  245  


 


Seg Neutrophils %  51.8  


 


Lymphocytes %  38.1  


 


Monocytes %  5.2  


 


Eosinophils %  4.1  


 


Basophils %  0.8  


 


Absolute Neutrophils  3.3  


 


Absolute Lymphocytes  2.5  


 


Absolute Monocytes  0.3  


 


Absolute Eosinophils  0.3  


 


Absolute Basophils  0.1  


 


Sodium   144.6 


 


Potassium   3.8 


 


Chloride   112 H 


 


Carbon Dioxide   21 L 


 


Anion Gap   12 


 


BUN   12 


 


Creatinine   0.84 


 


Est GFR ( Amer)   > 60 


 


Est GFR (Non-Af Amer)   > 60 


 


Glucose   156 H 


 


Calcium   8.9 


 


Total Bilirubin   0.3 


 


AST   12 L 


 


ALT   24 


 


Alkaline Phosphatase   62 


 


Total Protein   7.4 


 


Albumin   4.7 


 


Serum HCG, Qual    NEGATIVE














Assessment & Plan





- Diagnosis


(1) Depression


Qualifiers: 


   Depression Type: unspecified   Qualified Code(s): F32.9 - Major depressive 

disorder, single episode, unspecified   


Is this a current diagnosis for this admission?: Yes   


Plan: 


Patient will be evaluated by psychiatric services and therapy will be initiated 

or patient will be referred for inpatient psychiatric therapy where she will be 

started on a treatment program.








(2) Suicidal ideation


Is this a current diagnosis for this admission?: Yes   


Plan: 


Patient will be evaluated by psychiatric services.  Involuntary commitment 

papers have been filled out and are available on the chart.








(3) Headache


Qualifiers: 


   Headache type: unspecified   Headache chronicity pattern: acute headache   

Intractability: not intractable   Qualified Code(s): R51 - Headache   


Is this a current diagnosis for this admission?: Yes   


Plan: 


Patient will be treated with Tylenol as needed for headache pain and if pain is 

severe she can be treated with Nubain 10 mg IV every 3 hours as needed severe 

headache.








(4) Intentional overdose of drug in tablet form


Is this a current diagnosis for this admission?: Yes   


Plan: 


Patient will be evaluated by psychiatric services and appropriate treatment and 

disposition will be determined.  Involuntary commitment papers are available on 

the patient's chart.








- Time


Time Spent: 30 to 50 Minutes


Critical Time spent with patient: Less than 15 minutes


Anticipated discharge: Other - Inpatient psychiatric facility





- Inpatient Certification


Based on my medical assessment, after consideration of the patient's c

omorbidities, presenting symptoms, or acuity I expect that the services needed 

warrant INPATIENT care.: Yes


I certify that my determination is in accordance with my understanding of 

Medicare's requirements for reasonable and necessary INPATIENT services [42 CFR 

412.3e].: Yes


Medical Necessity: Need Close Monitoring Due to Risk of Patient Decompensation, 

Need For Continuous Telemetry Monitoring, Risk of Complication if Not Cared For 

in Hospital

## 2019-01-24 LAB
ADD MANUAL DIFF: NO
ALBUMIN SERPL-MCNC: 4 G/DL (ref 3.5–5)
ALP SERPL-CCNC: 51 U/L (ref 38–126)
ALT SERPL-CCNC: 24 U/L (ref 9–52)
ANION GAP SERPL CALC-SCNC: 6 MMOL/L (ref 5–19)
AST SERPL-CCNC: 10 U/L (ref 14–36)
BASOPHILS # BLD AUTO: 0 10^3/UL (ref 0–0.2)
BASOPHILS NFR BLD AUTO: 0.6 % (ref 0–2)
BILIRUB DIRECT SERPL-MCNC: 0.1 MG/DL (ref 0–0.4)
BILIRUB SERPL-MCNC: 0.8 MG/DL (ref 0.2–1.3)
BUN SERPL-MCNC: 10 MG/DL (ref 7–20)
CALCIUM: 9 MG/DL (ref 8.4–10.2)
CHLORIDE SERPL-SCNC: 108 MMOL/L (ref 98–107)
CHOLEST SERPL-MCNC: 155.86 MG/DL (ref 0–200)
CO2 SERPL-SCNC: 25 MMOL/L (ref 22–30)
EOSINOPHIL # BLD AUTO: 0.1 10^3/UL (ref 0–0.6)
EOSINOPHIL NFR BLD AUTO: 1.8 % (ref 0–6)
ERYTHROCYTE [DISTWIDTH] IN BLOOD BY AUTOMATED COUNT: 12.7 % (ref 11.5–14)
FREE T4 (FREE THYROXINE): 1 NG/DL (ref 0.78–2.19)
GLUCOSE SERPL-MCNC: 90 MG/DL (ref 75–110)
HCT VFR BLD CALC: 35.3 % (ref 36–47)
HGB BLD-MCNC: 12 G/DL (ref 12–15.5)
LDLC SERPL DIRECT ASSAY-MCNC: 71 MG/DL (ref ?–100)
LYMPHOCYTES # BLD AUTO: 1.4 10^3/UL (ref 0.5–4.7)
LYMPHOCYTES NFR BLD AUTO: 31.2 % (ref 13–45)
MCH RBC QN AUTO: 30.5 PG (ref 27–33.4)
MCHC RBC AUTO-ENTMCNC: 34.1 G/DL (ref 32–36)
MCV RBC AUTO: 90 FL (ref 80–97)
MONOCYTES # BLD AUTO: 0.4 10^3/UL (ref 0.1–1.4)
MONOCYTES NFR BLD AUTO: 8.9 % (ref 3–13)
NEUTROPHILS # BLD AUTO: 2.7 10^3/UL (ref 1.7–8.2)
NEUTS SEG NFR BLD AUTO: 57.5 % (ref 42–78)
PLATELET # BLD: 206 10^3/UL (ref 150–450)
POTASSIUM SERPL-SCNC: 4.1 MMOL/L (ref 3.6–5)
PROT SERPL-MCNC: 6.5 G/DL (ref 6.3–8.2)
RBC # BLD AUTO: 3.94 10^6/UL (ref 3.72–5.28)
SODIUM SERPL-SCNC: 139.2 MMOL/L (ref 137–145)
T3FREE SERPL-MCNC: 3.37 PG/ML (ref 2.77–5.27)
TOTAL CELLS COUNTED % (AUTO): 100 %
TRIGL SERPL-MCNC: 64 MG/DL (ref ?–150)
TSH SERPL-ACNC: 2.63 UIU/ML (ref 0.47–4.68)
VLDLC SERPL CALC-MCNC: 13 MG/DL (ref 10–31)
WBC # BLD AUTO: 4.6 10^3/UL (ref 4–10.5)

## 2019-01-24 RX ADMIN — Medication SCH: at 14:03

## 2019-01-24 RX ADMIN — FAMOTIDINE SCH MG: 20 TABLET, FILM COATED ORAL at 22:12

## 2019-01-24 RX ADMIN — ACETAMINOPHEN PRN MG: 325 TABLET ORAL at 14:25

## 2019-01-24 RX ADMIN — FAMOTIDINE SCH: 20 TABLET, FILM COATED ORAL at 09:50

## 2019-01-24 RX ADMIN — ENOXAPARIN SODIUM SCH: 40 INJECTION SUBCUTANEOUS at 09:50

## 2019-01-24 RX ADMIN — Medication SCH ML: at 05:52

## 2019-01-24 RX ADMIN — Medication SCH ML: at 22:13

## 2019-01-24 NOTE — PSYCHOLOGICAL NOTE
Psych Note





- Psych Note


Date seen by psych provider: 01/24/19


Time seen by psych provider: 08:05


Psych Note: 


Reason for Consult: intentional overdose





Check-in conducted with patient


She reports that she is not "bad" stating that she feels that she was not trying

to kill herself that it was just a "impulse issue."  She is still unable or 

unwilling to discuss triggers however reports that she feels that alcohol may 

play a big role in the difficulties she has.  She reports that she does drink 

weekly 1-2 drinks however states that both this time and back in October she 

definitely drink more than "normal."  She reports that she plans to abstain from

alcohol and is hoping that she can get medication adjustment because she feels 

that her Wellbutrin was not working.  She discloses that she would like to have 

her roommate, Tanika, part of her plan of care.  She reports she does not want 

to go inpatient.





No medication recommendations at this time





Diagnosis


296.30 (F33.9) Major depressive disorder per history report by patient


291.9 (F10.99) Unspecified substance abuse;alcohol


R/O Bipolar disorder





Impression/Plan: Patient is recommended for continued IVC.  Patient reported 

intentional overdose on her Welburtrin.  She continues to be unable or unwilling

to discuss events leading up to overdose.  Today she identifies that it was just

an "impulse."  Patient will be reevaluated.  Dr. Anaya was consulted and care 

management this patient; attending physicians in agreement with recommendations 

and disposition.

## 2019-01-24 NOTE — EKG REPORT
SEVERITY:- OTHERWISE NORMAL ECG -

SINUS TACHYCARDIA

:

Confirmed by: Kristal Marie MD 24-Jan-2019 08:00:20

## 2019-01-24 NOTE — PDOC PROGRESS REPORT
Subjective


Progress Note for:: 01/24/19


Subjective:: 





1/23/2019-no acute events since admission.  Patient is complaining of nausea she

threw up p.o. Zofran then she was given IV Zofran and nausea was resolving.  No 

complaints from the patient.  Patient was started on Nubain every 3 hours for 

headaches.  Plan to discontinue Nubain started on Percocet 2 tablets every 4 as 

needed.





1/24/2019 no acute events in the last 24 hours.  Patient is alert and oriented 

communicating very well.  Patient is getting occasionally tachycardic whenever 

she gets anxiety.  With activity heart rate is going up to more than 120.  

Patient is asymptomatic.  Discussed the case with the charge from the 

psychiatric department the request for medical clearance if possible and they 

are planning to find a place for her in a psychiatric hospital.  Patient is 

under involuntary commitment.


Reason For Visit: 


BUPROPION OVERDOSE, IVC PAPERS COMPLETED








Physical Exam


Vital Signs: 


                                        











Temp Pulse Resp BP Pulse Ox


 


 98.7 F   86   16   112/69   97 


 


 01/24/19 15:46  01/24/19 15:46  01/24/19 15:46  01/24/19 15:46  01/24/19 15:46








                                 Intake & Output











 01/23/19 01/24/19 01/25/19





 06:59 06:59 06:59


 


Intake Total  1700 630


 


Balance  1700 630


 


Weight  58.3 kg 











General appearance: PRESENT: no acute distress


Eye exam: PRESENT: PERRLA


Mouth exam: PRESENT: moist


Neck exam: ABSENT: carotid bruit, JVD, lymphadenopathy, thyromegaly


Respiratory exam: PRESENT: clear to auscultation milla.  ABSENT: rales, rhonchi, 

wheezes


Cardiovascular exam: PRESENT: tachycardia


GI/Abdominal exam: PRESENT: normal bowel sounds, soft.  ABSENT: distended, 

guarding, mass, organolmegaly, rebound, tenderness


Extremities exam: PRESENT: full ROM.  ABSENT: calf tenderness, clubbing, pedal 

edema


Neurological exam: PRESENT: alert, awake, oriented to person, oriented to place,

oriented to time, oriented to situation, CN II-XII grossly intact.  ABSENT: 

motor sensory deficit


Psychiatric exam: PRESENT: appropriate affect, normal mood.  ABSENT: homicidal 

ideation, suicidal ideation





Results


Laboratory Results: 


                                        





                                 01/24/19 07:43 





                                 01/24/19 07:43 





                                        











  01/24/19 01/24/19 01/24/19





  07:43 07:43 07:43


 


WBC  4.6  


 


RBC  3.94  


 


Hgb  12.0  


 


Hct  35.3 L  


 


MCV  90  


 


MCH  30.5  


 


MCHC  34.1  


 


RDW  12.7  


 


Plt Count  206  


 


Seg Neutrophils %  57.5  


 


Lymphocytes %  31.2  


 


Monocytes %  8.9  


 


Eosinophils %  1.8  


 


Basophils %  0.6  


 


Absolute Neutrophils  2.7  


 


Absolute Lymphocytes  1.4  


 


Absolute Monocytes  0.4  


 


Absolute Eosinophils  0.1  


 


Absolute Basophils  0.0  


 


Sodium    139.2


 


Potassium    4.1


 


Chloride    108 H


 


Carbon Dioxide    25


 


Anion Gap    6


 


BUN    10


 


Creatinine    0.84


 


Est GFR ( Amer)    > 60


 


Est GFR (Non-Af Amer)    > 60


 


Glucose    90


 


Calcium    9.0


 


Magnesium    1.9


 


Total Bilirubin    0.8


 


AST    10 L


 


ALT    24


 


Alkaline Phosphatase    51


 


Total Protein    6.5


 


Albumin    4.0


 


Triglycerides    64


 


Cholesterol    155.86


 


LDL Cholesterol Direct    71


 


VLDL Cholesterol    13.0


 


HDL Cholesterol    78


 


TSH   2.63 


 


Free T4   1.00 


 


Free T3 pg/mL   3.37 














Assessment & Plan





- Diagnosis


(1) Depression


Qualifiers: 


   Depression Type: unspecified   Qualified Code(s): F32.9 - Major depressive 

disorder, single episode, unspecified   


Is this a current diagnosis for this admission?: Yes   


Plan: 


1/23/2019-patient was admitted with a depression with intentional intake of 30 

tablets of Wellbutrin.  She was under IVC.  A consult was requested.





1/24/2019-patient was admitted for depression and she took 30 tablets of 

Wellbutrin intentionally before that she said she has 4 glasses of wine.  But 

alcohol level is here is 248.  Patient has been told the nurses that if she 

drinks very heavily almost on daily basis.  I am concerned about DTs.








(2) Suicidal ideation


Is this a current diagnosis for this admission?: Yes   


Plan: 


1/23/2019 patient was admitted after intentional intake of 30 tablets of 

Wellbutrin.  Psych consult was requested, involuntary commitment papers were 

done.





24 2018 patient denies any suicidal ideation or thoughts.  She took the 

Wellbutrin intentionally as per the patient.








(3) Intentional overdose of drug in tablet form


Is this a current diagnosis for this admission?: Yes   


Plan: 


1/23/2019-patient took 30 tablets of Wellbutrin intentionally.  Psych consult 

was requested their input is will be appreciated.  Patient is under IVC.





1/24/2019-intentional overdose of Wellbutrin medication.  She is doing much 

better.  No complications during the hospital stay.








(4) Tachycardia


Is this a current diagnosis for this admission?: Yes   


Plan: 


1/24/2019-she is having sinus tachycardia occasionally heart rate is going up to

140.  Patient is asymptomatic.  I am going to arrange for twelve-lead EKG.








(5) Alcohol use


Is this a current diagnosis for this admission?: Yes   


Plan: 


Alcohol level yesterday is 248.  Patient's  informed the nurses that 

patient is a drinks heavily on daily basis.  plan to watch for DTs while she was

in the hospital.








- Time


Time Spent with patient: 15-24 minutes


Smoking Cessation Education: over 10 minutes


Medications reviewed and adjusted accordingly: Yes

## 2019-01-25 LAB
ADD MANUAL DIFF: NO
ANION GAP SERPL CALC-SCNC: 8 MMOL/L (ref 5–19)
BASOPHILS # BLD AUTO: 0 10^3/UL (ref 0–0.2)
BASOPHILS NFR BLD AUTO: 0.6 % (ref 0–2)
BUN SERPL-MCNC: 9 MG/DL (ref 7–20)
CALCIUM: 9 MG/DL (ref 8.4–10.2)
CHLORIDE SERPL-SCNC: 107 MMOL/L (ref 98–107)
CO2 SERPL-SCNC: 24 MMOL/L (ref 22–30)
EOSINOPHIL # BLD AUTO: 0.1 10^3/UL (ref 0–0.6)
EOSINOPHIL NFR BLD AUTO: 2.6 % (ref 0–6)
ERYTHROCYTE [DISTWIDTH] IN BLOOD BY AUTOMATED COUNT: 12.5 % (ref 11.5–14)
GLUCOSE SERPL-MCNC: 91 MG/DL (ref 75–110)
HCT VFR BLD CALC: 35.9 % (ref 36–47)
HGB BLD-MCNC: 12.1 G/DL (ref 12–15.5)
LYMPHOCYTES # BLD AUTO: 2 10^3/UL (ref 0.5–4.7)
LYMPHOCYTES NFR BLD AUTO: 43.7 % (ref 13–45)
MCH RBC QN AUTO: 30.2 PG (ref 27–33.4)
MCHC RBC AUTO-ENTMCNC: 33.8 G/DL (ref 32–36)
MCV RBC AUTO: 89 FL (ref 80–97)
MONOCYTES # BLD AUTO: 0.4 10^3/UL (ref 0.1–1.4)
MONOCYTES NFR BLD AUTO: 8.2 % (ref 3–13)
NEUTROPHILS # BLD AUTO: 2.1 10^3/UL (ref 1.7–8.2)
NEUTS SEG NFR BLD AUTO: 44.9 % (ref 42–78)
PLATELET # BLD: 213 10^3/UL (ref 150–450)
POTASSIUM SERPL-SCNC: 4.1 MMOL/L (ref 3.6–5)
RBC # BLD AUTO: 4.02 10^6/UL (ref 3.72–5.28)
SODIUM SERPL-SCNC: 138.6 MMOL/L (ref 137–145)
TOTAL CELLS COUNTED % (AUTO): 100 %
WBC # BLD AUTO: 4.6 10^3/UL (ref 4–10.5)

## 2019-01-25 RX ADMIN — ENOXAPARIN SODIUM SCH: 40 INJECTION SUBCUTANEOUS at 09:40

## 2019-01-25 RX ADMIN — FAMOTIDINE SCH: 20 TABLET, FILM COATED ORAL at 09:40

## 2019-01-25 RX ADMIN — BUSPIRONE HYDROCHLORIDE SCH MG: 10 TABLET ORAL at 21:54

## 2019-01-25 RX ADMIN — Medication SCH: at 05:39

## 2019-01-25 RX ADMIN — Medication SCH ML: at 21:54

## 2019-01-25 RX ADMIN — Medication SCH: at 14:45

## 2019-01-25 RX ADMIN — VENLAFAXINE HYDROCHLORIDE SCH MG: 37.5 CAPSULE, EXTENDED RELEASE ORAL at 21:54

## 2019-01-25 RX ADMIN — FAMOTIDINE SCH MG: 20 TABLET, FILM COATED ORAL at 21:54

## 2019-01-25 NOTE — PSYCHOLOGICAL NOTE
Psych Note





- Psych Note


Date seen by psych provider: 01/25/19


Psych Note: 


Reason for Consult: intentional overdose





Check-in conducted with patient








Medication recommendations per [facility]'s contracted psychiatrist Dr Waqar FLORES are

as follows


Zyprexa 5mg every morning and 2.5mg every evening


Cogentin 1mg daily 


please discontinue Oxycodone


Start tramadol 25 mg every 6 hours PRN


Start Effexor 37.5 mg twice daily


Start BuSpar 10 mg twice daily





Diagnosis


296.30 (F33.9) Major depressive disorder per history report by patient


291.9 (F10.99) Unspecified substance abuse;alcohol


R/O Bipolar disorder





Impression/Plan: Patient is recommended for continued IVC.  Patient reported 

intentional overdose on her Welburtrin.  She continues to be unable or unwilling

to discuss events leading up to overdose.  Today she identifies that it was just

an "impulse."  Patient will be reevaluated.  Dr. Anaya was consulted and care 

management this patient; attending physicians in agreement with recommendations 

and disposition.

## 2019-01-25 NOTE — PDOC PROGRESS REPORT
Subjective


Progress Note for:: 01/25/19


Subjective:: 





1/23/2019-no acute events since admission.  Patient is complaining of nausea she

threw up p.o. Zofran then she was given IV Zofran and nausea was resolving.  No 

complaints from the patient.  Patient was started on Nubain every 3 hours for 

headaches.  Plan to discontinue Nubain started on Percocet 2 tablets every 4 as 

needed.





1/24/2019 no acute events in the last 24 hours.  Patient is alert and oriented 

communicating very well.  Patient is getting occasionally tachycardic whenever 

she gets anxiety.  With activity heart rate is going up to more than 120.  

Patient is asymptomatic.  Discussed the case with the charge from the 

psychiatric department the request for medical clearance if possible and they 

are planning to find a place for her in a psychiatric hospital.  Patient is 

under involuntary commitment.





1/25/2019 this is an female admitted with Wellbutrin intentional overdose she 

was under involuntary commitment the psych team called me they want to put the 

patient on Zyprexa 5 mg in the morning 2.5 mg in the evening and Cogentin 1 mg 

daily.  They want to see the response to the medications if she is not responded

well my understanding is that they may send her to the psychiatric facility.  No

acute events in the last 24 hours.  Patient is afebrile.


Reason For Visit: 


BUPROPION OVERDOSE, IVC PAPERS COMPLETED








Physical Exam


Vital Signs: 


                                        











Temp Pulse Resp BP Pulse Ox


 


 98.7 F   81   16   108/52 L  100 


 


 01/25/19 10:00  01/25/19 10:00  01/25/19 10:00  01/25/19 10:00  01/25/19 10:00








                                 Intake & Output











 01/24/19 01/25/19 01/26/19





 06:59 06:59 06:59


 


Intake Total 1700 1476 466


 


Balance 1700 1476 466


 


Weight 58.3 kg 58.2 kg 











General appearance: PRESENT: no acute distress


Head exam: PRESENT: atraumatic


Eye exam: PRESENT: PERRLA


Mouth exam: PRESENT: dry mucosa


Respiratory exam: PRESENT: clear to auscultation milla.  ABSENT: rales, rhonchi, 

wheezes


Pulses: PRESENT: normal dorsalis pedis pul


GI/Abdominal exam: PRESENT: normal bowel sounds, soft.  ABSENT: distended, 

guarding, mass, organolmegaly, rebound, tenderness


Extremities exam: PRESENT: full ROM.  ABSENT: calf tenderness, clubbing, pedal 

edema


Neurological exam: PRESENT: alert, awake, oriented to person, oriented to place,

oriented to time, oriented to situation, CN II-XII grossly intact.  ABSENT: 

motor sensory deficit


Psychiatric exam: PRESENT: appropriate affect, normal mood.  ABSENT: homicidal 

ideation, suicidal ideation





Results


Laboratory Results: 


                                        





                                 01/25/19 07:02 





                                 01/25/19 07:02 





                                        











  01/25/19 01/25/19





  07:02 07:02


 


WBC  4.6 


 


RBC  4.02 


 


Hgb  12.1 


 


Hct  35.9 L 


 


MCV  89 


 


MCH  30.2 


 


MCHC  33.8 


 


RDW  12.5 


 


Plt Count  213 


 


Seg Neutrophils %  44.9 


 


Lymphocytes %  43.7 


 


Monocytes %  8.2 


 


Eosinophils %  2.6 


 


Basophils %  0.6 


 


Absolute Neutrophils  2.1 


 


Absolute Lymphocytes  2.0 


 


Absolute Monocytes  0.4 


 


Absolute Eosinophils  0.1 


 


Absolute Basophils  0.0 


 


Sodium   138.6


 


Potassium   4.1


 


Chloride   107


 


Carbon Dioxide   24


 


Anion Gap   8


 


BUN   9


 


Creatinine   0.84


 


Est GFR ( Amer)   > 60


 


Est GFR (Non-Af Amer)   > 60


 


Glucose   91


 


Calcium   9.0


 


Magnesium   1.9














Assessment & Plan





- Diagnosis


(1) Depression


Qualifiers: 


   Depression Type: unspecified   Qualified Code(s): F32.9 - Major depressive 

disorder, single episode, unspecified   


Is this a current diagnosis for this admission?: Yes   


Plan: 


1/23/2019-patient was admitted with a depression with intentional intake of 30 

tablets of Wellbutrin.  She was under IVC.  A consult was requested.





1/24/2019-patient was admitted for depression and she took 30 tablets of 

Wellbutrin intentionally before that she said she has 4 glasses of wine.  But 

alcohol level is here is 248.  Patient has been told the nurses that if she 

drinks very heavily almost on daily basis.  I am concerned about DTs.





1/25/2019-this 23-year-old female admitted with intentional intake of 30 tablets

of Wellbutrin.  She was under involuntary commitment.   told the nurses 

that patient is a heavy drinker.  Be watching for the DTs.  Patient denies any 

signs of depression today.  Psych recommended to start on Zyprexa 5 mg in the 

morning 2.5 mg in the evening and Cogentin 1 mg daily.  Plan is to see the 

response to these medications.








(2) Suicidal ideation


Is this a current diagnosis for this admission?: Yes   


Plan: 


1/23/2019 patient was admitted after intentional intake of 30 tablets of 

Wellbutrin.  Psych consult was requested, involuntary commitment papers were 

done.





1/24/2019 patient denies any suicidal ideation or thoughts.  She took the 

Wellbutrin intentionally as per the patient.





1/25/2019-patient's is insisting that she denies any sort of  suicidal thoughts 

or ideation.  She is insisting that she took the Wellbutrin intentionally.  She 

is off Wellbutrin since the admission.  Patient denies any withdrawal effects.








(3) Intentional overdose of drug in tablet form


Is this a current diagnosis for this admission?: Yes   


Plan: 


1/23/2019-patient took 30 tablets of Wellbutrin intentionally.  Psych consult 

was requested their input is will be appreciated.  Patient is under IVC.





1/24/2019-intentional overdose of Wellbutrin medication.  She is doing much 

better.  No complications during the hospital stay.





1/25/2019-patient is doing much better.  Denies any anxiety depression, suicidal

thoughts or ideation.








(4) Tachycardia


Is this a current diagnosis for this admission?: Yes   


Plan: 


1/24/2019-she is having sinus tachycardia occasionally heart rate is going up to

140.  Patient is asymptomatic.  I am going to arrange for twelve-lead EKG.





1/25/2019 patient is occasionally tachycardic heart rate is going up to 120s 

whenever she gets anxiety spells.  We did twelve-lead EKG EKG shows sinus 

rhythum..








(5) Alcohol use


Is this a current diagnosis for this admission?: Yes   


Plan: 


Alcohol level yesterday is 248.  Patient's  informed the nurses that 

patient is a drinks heavily on daily basis.  plan to watch for DTs while she was

in the hospital.








- Time


Time Spent with patient: 15-24 minutes


Smoking Cessation Education: over 10 minutes


Medications reviewed and adjusted accordingly: Yes

## 2019-01-26 VITALS — SYSTOLIC BLOOD PRESSURE: 103 MMHG | DIASTOLIC BLOOD PRESSURE: 65 MMHG

## 2019-01-26 LAB
ADD MANUAL DIFF: NO
ANION GAP SERPL CALC-SCNC: 7 MMOL/L (ref 5–19)
BASOPHILS # BLD AUTO: 0 10^3/UL (ref 0–0.2)
BASOPHILS NFR BLD AUTO: 0.8 % (ref 0–2)
BUN SERPL-MCNC: 10 MG/DL (ref 7–20)
CALCIUM: 9.3 MG/DL (ref 8.4–10.2)
CHLORIDE SERPL-SCNC: 108 MMOL/L (ref 98–107)
CO2 SERPL-SCNC: 26 MMOL/L (ref 22–30)
EOSINOPHIL # BLD AUTO: 0.2 10^3/UL (ref 0–0.6)
EOSINOPHIL NFR BLD AUTO: 3.4 % (ref 0–6)
ERYTHROCYTE [DISTWIDTH] IN BLOOD BY AUTOMATED COUNT: 12.6 % (ref 11.5–14)
GLUCOSE SERPL-MCNC: 91 MG/DL (ref 75–110)
HCT VFR BLD CALC: 37.6 % (ref 36–47)
HGB BLD-MCNC: 13 G/DL (ref 12–15.5)
LYMPHOCYTES # BLD AUTO: 2.8 10^3/UL (ref 0.5–4.7)
LYMPHOCYTES NFR BLD AUTO: 47.8 % (ref 13–45)
MCH RBC QN AUTO: 30.4 PG (ref 27–33.4)
MCHC RBC AUTO-ENTMCNC: 34.6 G/DL (ref 32–36)
MCV RBC AUTO: 88 FL (ref 80–97)
MONOCYTES # BLD AUTO: 0.5 10^3/UL (ref 0.1–1.4)
MONOCYTES NFR BLD AUTO: 8.5 % (ref 3–13)
NEUTROPHILS # BLD AUTO: 2.3 10^3/UL (ref 1.7–8.2)
NEUTS SEG NFR BLD AUTO: 39.5 % (ref 42–78)
PLATELET # BLD: 235 10^3/UL (ref 150–450)
POTASSIUM SERPL-SCNC: 4.3 MMOL/L (ref 3.6–5)
RBC # BLD AUTO: 4.28 10^6/UL (ref 3.72–5.28)
SODIUM SERPL-SCNC: 140.9 MMOL/L (ref 137–145)
TOTAL CELLS COUNTED % (AUTO): 100 %
WBC # BLD AUTO: 5.8 10^3/UL (ref 4–10.5)

## 2019-01-26 RX ADMIN — BUSPIRONE HYDROCHLORIDE SCH MG: 10 TABLET ORAL at 09:22

## 2019-01-26 RX ADMIN — Medication SCH: at 05:30

## 2019-01-26 RX ADMIN — FAMOTIDINE SCH MG: 20 TABLET, FILM COATED ORAL at 09:22

## 2019-01-26 RX ADMIN — Medication SCH: at 13:55

## 2019-01-26 RX ADMIN — ENOXAPARIN SODIUM SCH: 40 INJECTION SUBCUTANEOUS at 09:25

## 2019-01-26 RX ADMIN — VENLAFAXINE HYDROCHLORIDE SCH MG: 37.5 CAPSULE, EXTENDED RELEASE ORAL at 09:22

## 2019-01-26 NOTE — PDOC DISCHARGE SUMMARY
General





- Admit/Disc Date/PCP


Admission Date/Primary Care Provider: 


  01/23/19 04:56





  





Discharge Date: 01/26/19





- Discharge Diagnosis


(1) Depression


Is this a current diagnosis for this admission?: Yes   


Summary: 


1/23/2019-patient was admitted with a depression with intentional intake of 30 

tablets of Wellbutrin.  She was under IVC.  A consult was requested.





1/24/2019-patient was admitted for depression and she took 30 tablets of 

Wellbutrin intentionally before that she said she has 4 glasses of wine.  But 

alcohol level is here is 248.  Patient has been told the nurses that if she 

drinks very heavily almost on daily basis.  I am concerned about DTs.





1/25/2019-this 23-year-old female admitted with intentional intake of 30 tablets

of Wellbutrin.  She was under involuntary commitment.   told the nurses 

that patient is a heavy drinker.  Be watching for the DTs.  Patient denies any 

signs of depression today.  Psych recommended to start on Zyprexa 5 mg in the 

morning 2.5 mg in the evening and Cogentin 1 mg daily.  Plan is to see the 

response to these medications.





1/26/2019 patient has history of depression she is on Wellbutrin she is off the 

Wellbutrin now because she took more than 30 tablets of it at home no evidence 

of any anxiety depression during the hospital stay.





1/26/ 2019 psych see the patient today they revoked the IVC papers.  I spoke to 

the psychiatric team they said patient is cleared to go home but she needs to 

take the Zyprexa 5 mg in the morning 2.5 mg in the evening Cogentin 1 mg p.o. 

daily, BuSpar 10 mg p.o. twice daily and Effexor 37.5 mg p.o. twice daily.  

Under the going to see her as an outpatient.  pt agreed to go home today.








(2) Suicidal ideation


Is this a current diagnosis for this admission?: Yes   


Summary: 


1/23/2019 patient was admitted after intentional intake of 30 tablets of 

Wellbutrin.  Psych consult was requested, involuntary commitment papers were don

e.





1/24/2019 patient denies any suicidal ideation or thoughts.  She took the 

Wellbutrin intentionally as per the patient.





1/25/2019-patient's is insisting that she denies any sort of  suicidal thoughts 

or ideation.  She is insisting that she took the Wellbutrin intentionally.  She 

is off Wellbutrin since the admission.  Patient denies any withdrawal effects.








1/26/2019-patient denies any suicidal thoughts or ideation.  She is adamant that

she took Wellbutrin intentionally.








1/26/2019-patient says she took the Wellbutrin tablets more than 30 

intentionally denies any suicidal thoughts and ideation.








(3) Intentional overdose of drug in tablet form


Is this a current diagnosis for this admission?: Yes   


Summary: 


1/23/2019-patient took 30 tablets of Wellbutrin intentionally.  Psych consult 

was requested their input is will be appreciated.  Patient is under IVC.





1/24/2019-intentional overdose of Wellbutrin medication.  She is doing much 

better.  No complications during the hospital stay.





1/25/2019-patient is doing much better.  Denies any anxiety depression, suicidal

thoughts or ideation.





1/26/2019-patient was admitted with intentional overdose of Wellbutrin.








26 2019 patient admitted that she took the Wellbutrin overdose intentionally 

prior to that she said she has had a few glasses of wine followed by the miguel

stion of the Wellbutrin tablets.  She is saying she took medications 

intentionally but had no thoughts of hurting herself








(4) Tachycardia


Is this a current diagnosis for this admission?: Yes   


Summary: 


1/24/2019-she is having sinus tachycardia occasionally heart rate is going up to

140.  Patient is asymptomatic.  I am going to arrange for twelve-lead EKG.





1/25/2019 patient is occasionally tachycardic heart rate is going up to 120s 

whenever she gets anxiety spells.  We did twelve-lead EKG shows sinus rhythum.





1/26/2019 patient is tachycardic with minimal activity heart rate is going up to

140s 150s.  On planning to start her on metoprolol low-dose 12.5 mg p.o. twice a

day.





1/26/2019 patient is tachycardic with minimal activity at rest heart rate is 

coming down to 90s with activity heart rate is going up to 140s she was started 

on metoprolol 12.5 mg p.o. twice daily I am going to give a prescription for her

to take home I strongly advised her to follow-up with primary care physician in 

1 week for further management.  pt agreed and verbalized response.








(5) Alcohol use


Is this a current diagnosis for this admission?: Yes   


Summary: 


Alcohol level yesterday is 248.  Patient's  informed the nurses that 

patient is a drinks heavily on daily basis.  plan to watch for DTs while she was

in the hospital.








1/26/2018-patient has history of heavy alcohol use we are watching her for DTs 

so far no signs of any withdrawal.  Psych team is also following the patient.





1/26/2019 patient family is telling patient is a heavy alcohol user and we 

advised her for DTs but no withdrawal symptoms are noticed during the hospital 

stay.  In my opinion patient is stable enough to go home.  Patient was strongly 

advised to quit alcohol intake.








- Additional Information


Resuscitation Status: Full Code


Discharge Diet: Regular


Discharge Activity: Activity As Tolerated


Prescriptions: 


Benztropine Mesylate [Cogentin 1 mg Tablet] 1 mg PO DAILY #30 tablet


Buspirone HCl [Buspar 10 mg Tablet] 10 mg PO Q12 #60 tablet


Metoprolol Tartrate [Lopressor 25 mg Tablet] 12.5 mg PO Q12 #60 tablet


Olanzapine [Zyprexa 2.5 mg Tablet] 2.5 mg PO QHS #30 tablet


Olanzapine [Zyprexa 5 mg Tablet] 5 mg PO QAM #30 tablet


Tramadol HCl [Ultram 50 mg Tablet] 25 mg PO Q6HP PRN #120 tablet


 PRN Reason: 


Venlafaxine HCl ER [Effexor Xr 37.5 mg Cap.sr] 37.5 mg PO Q12 #60 cap.sr.24h


Home Medications: 








Benztropine Mesylate [Cogentin 1 mg Tablet] 1 mg PO DAILY #30 tablet 01/26/19 


Buspirone HCl [Buspar 10 mg Tablet] 10 mg PO Q12 #60 tablet 01/26/19 


Metoprolol Tartrate [Lopressor 25 mg Tablet] 12.5 mg PO Q12 #60 tablet 01/26/19 


Olanzapine [Zyprexa 2.5 mg Tablet] 2.5 mg PO QHS #30 tablet 01/26/19 


Olanzapine [Zyprexa 5 mg Tablet] 5 mg PO QAM #30 tablet 01/26/19 


Tramadol HCl [Ultram 50 mg Tablet] 25 mg PO Q6HP PRN #120 tablet 01/26/19 


Venlafaxine HCl ER [Effexor Xr 37.5 mg Cap.sr] 37.5 mg PO Q12 #60 cap.sr.24h 

01/26/19 











History of Present Illness


History of Present Illness: 


QUIRINO SCHROEDER is a 23 year old female


who presented to the emergency room within an hour of taking 30 capsules of 

bupropion 150 mg at home.  Patient states that the affect was more 1 of an 

impulse than of a thought out plan to actually kill herself.  The impulse was 

definitely suicidal but she very quickly changed her mind after taking the 

pills.  She denies any symptoms after ingestion with the exception of drowsiness

and a moderately severe bifrontal headache.  In the emergency room she was found

to have essentially negative evaluation and was subsequently admitted to 

observation status for telemetry monitoring for arrhythmias and serial 

monitoring of her vital signs.  Involuntary commitment papers have been signed 

and the patient will most likely be seen by a inpatient psychiatric facility 

after discharge/transfer if advised by psychiatric services.





Physical Exam


Vital Signs: 


                                        











Temp Pulse Resp BP Pulse Ox


 


 97.8 F   82   15   103/65   98 


 


 01/26/19 16:00  01/26/19 16:00  01/26/19 16:00  01/26/19 16:00  01/26/19 16:00








                                 Intake & Output











 01/25/19 01/26/19 01/27/19





 06:59 06:59 06:59


 


Intake Total 1476 1666 


 


Balance 1476 1666 


 


Weight 58.2 kg 58.2 kg 











General appearance: PRESENT: no acute distress


Head exam: PRESENT: atraumatic


Eye exam: PRESENT: PERRLA


Mouth exam: PRESENT: moist


Neck exam: ABSENT: carotid bruit, JVD, lymphadenopathy, thyromegaly


Respiratory exam: PRESENT: clear to auscultation milla.  ABSENT: rales, rhonchi, 

wheezes


Cardiovascular exam: PRESENT: RRR.  ABSENT: diastolic murmur, rubs, systolic 

murmur


GI/Abdominal exam: PRESENT: normal bowel sounds, soft.  ABSENT: distended, 

guarding, mass, organolmegaly, rebound, tenderness


Extremities exam: PRESENT: full ROM.  ABSENT: calf tenderness, clubbing, pedal 

edema


Neurological exam: PRESENT: alert, awake, oriented to person, oriented to place,

oriented to time, oriented to situation, CN II-XII grossly intact.  ABSENT: 

motor sensory deficit


Psychiatric exam: PRESENT: appropriate affect, normal mood.  ABSENT: homicidal 

ideation, suicidal ideation





Results


Laboratory Results: 


                                        





                                 01/26/19 06:23 





                                 01/26/19 06:23 





                                        











  01/26/19 01/26/19





  06:23 06:23


 


WBC  5.8 


 


RBC  4.28 


 


Hgb  13.0 


 


Hct  37.6 


 


MCV  88 


 


MCH  30.4 


 


MCHC  34.6 


 


RDW  12.6 


 


Plt Count  235 


 


Seg Neutrophils %  39.5 L 


 


Lymphocytes %  47.8 H 


 


Monocytes %  8.5 


 


Eosinophils %  3.4 


 


Basophils %  0.8 


 


Absolute Neutrophils  2.3 


 


Absolute Lymphocytes  2.8 


 


Absolute Monocytes  0.5 


 


Absolute Eosinophils  0.2 


 


Absolute Basophils  0.0 


 


Sodium   140.9


 


Potassium   4.3


 


Chloride   108 H


 


Carbon Dioxide   26


 


Anion Gap   7


 


BUN   10


 


Creatinine   0.82


 


Est GFR ( Amer)   > 60


 


Est GFR (Non-Af Amer)   > 60


 


Glucose   91


 


Calcium   9.3


 


Magnesium   2.1














Qualifiers





- *


PATIENT BEING DISCHARGED WITH ANY OF THE FOLLOWING DIAGNOSIS: No


VTE patient discharged on overlapping Therapy?: No

## 2019-01-26 NOTE — PSYCHOLOGICAL NOTE
Psych Note





- Psych Note


Date seen by psych provider: 01/26/19


Time seen by psych provider: 16:15


Psych Note: 


Reason for Consult: intentional overdose





Check-in conducted with patient


Patient reports she is feeling well today.  She disclosed that she has been a 

little more tired than normal.  She disclosed that she has not been wanting any 

alcohol and is not concerned with cravings. She identifies her roommate as her 

support system until her  gets home next month.  She is unable to provide

the number to her roommate.  Patient continued to denies thoughts of harming 

herself since she first arrival to [facility].  She identifies drinking alcohol as her 

trigger which lowers her impulse control. Patient confirms she is going to 

abstain from drinking and wants to follow through with mental health 

recommendations. Patient's mood is euthymic with congruent affect, maintains 

good eye contact, and actively engages with clinician. 





Medication recommendations per [facility]'s contracted psychiatrist Dr Waqar FLORES are

as follows


Zyprexa 5mg every morning and 2.5mg every evening


Cogentin 1mg daily 


please discontinue Oxycodone


Start tramadol 25 mg every 6 hours PRN


Start Effexor 37.5 mg twice daily


Start BuSpar 10 mg twice daily





Diagnosis


296.30 (F33.9) Major depressive disorder per history report by patient


291.9 (F10.99) Unspecified substance abuse;alcohol


R/O Bipolar disorder





Impression/Plan: Patient is recommended for rescind of IVC and is cleared from 

acute psychiatric services.  Patient has been consistently denying suicidal 

ideation and identifies alcohol as her trigger.  She openly engaged with 

clinician with problem-solving, and confirms wanting to follow through with 

mental health recommendations. Patient identifies her support network as her 

roommate and her  once he returns from deployment next month.  Patient is

recommended to continue with outpatient mental health services for therapeutic 

services, substance abuse treatment and medication management. Dr. Anaya was 

consulted and care management this patient; attending physicians in agreement 

with recommendations and disposition.

## 2019-01-26 NOTE — PDOC PROGRESS REPORT
Subjective


Progress Note for:: 01/26/19


Subjective:: 





1/23/2019-no acute events since admission.  Patient is complaining of nausea she

threw up p.o. Zofran then she was given IV Zofran and nausea was resolving.  No 

complaints from the patient.  Patient was started on Nubain every 3 hours for 

headaches.  Plan to discontinue Nubain started on Percocet 2 tablets every 4 as 

needed.





1/24/2019 no acute events in the last 24 hours.  Patient is alert and oriented 

communicating very well.  Patient is getting occasionally tachycardic whenever 

she gets anxiety.  With activity heart rate is going up to more than 120.  

Patient is asymptomatic.  Discussed the case with the charge from the 

psychiatric department the request for medical clearance if possible and they 

are planning to find a place for her in a psychiatric hospital.  Patient is 

under involuntary commitment.





1/25/2019 this is an female admitted with Wellbutrin intentional overdose she 

was under involuntary commitment the psych team called me they want to put the 

patient on Zyprexa 5 mg in the morning 2.5 mg in the evening and Cogentin 1 mg 

daily.  They want to see the response to the medications if she is not responded

well my understanding is that they may send her to the psychiatric facility.  No

acute events in the last 24 hours.  Patient is afebrile.





1/26/2019 the Maciel Doe female admitted for a Wellbutrin intentional overdose.  

She is also is a heavy drinker.  Psych evaluation was done the recommendation is

to start on Zyprexa 5 mg in the morning 2.5 mg in the evening and also Cogentin 

1 mg p.o. twice a day.  There also was to start the patient on BuSpar 10 mg p.o.

twice a day and Effexor 37.5 mg p.o. twice a day.  And is comfortably sleeping 

in the bed.  No complications.  With minimal activity patient's heart rate is 

going up to 140s to 150s.  Plan to start on metoprolol 12.5 mg p.o. twice a day.


Reason For Visit: 


BUPROPION OVERDOSE, IVC PAPERS COMPLETED








Physical Exam


Vital Signs: 


                                        











Temp Pulse Resp BP Pulse Ox


 


 98.1 F   74   16   111/65   99 


 


 01/26/19 11:21  01/26/19 14:00  01/26/19 11:21  01/26/19 11:21  01/26/19 11:21








                                 Intake & Output











 01/25/19 01/26/19 01/27/19





 06:59 06:59 06:59


 


Intake Total 1476 1666 


 


Balance 1476 1666 


 


Weight 58.2 kg 58.2 kg 











General appearance: PRESENT: no acute distress


Head exam: PRESENT: atraumatic


Eye exam: PRESENT: PERRLA


Neck exam: ABSENT: carotid bruit, JVD, lymphadenopathy, thyromegaly


Respiratory exam: PRESENT: decreased breath sounds


Cardiovascular exam: PRESENT: tachycardia


GI/Abdominal exam: PRESENT: normal bowel sounds, soft.  ABSENT: distended, 

guarding, mass, organolmegaly, rebound, tenderness


Extremities exam: PRESENT: full ROM.  ABSENT: calf tenderness, clubbing, pedal 

edema


Neurological exam: PRESENT: alert, awake, oriented to person, oriented to place,

oriented to time, oriented to situation, CN II-XII grossly intact.  ABSENT: mot

or sensory deficit


Psychiatric exam: PRESENT: appropriate affect, normal mood.  ABSENT: homicidal 

ideation, suicidal ideation





Results


Laboratory Results: 


                                        





                                 01/26/19 06:23 





                                 01/26/19 06:23 





                                        











  01/26/19 01/26/19





  06:23 06:23


 


WBC  5.8 


 


RBC  4.28 


 


Hgb  13.0 


 


Hct  37.6 


 


MCV  88 


 


MCH  30.4 


 


MCHC  34.6 


 


RDW  12.6 


 


Plt Count  235 


 


Seg Neutrophils %  39.5 L 


 


Lymphocytes %  47.8 H 


 


Monocytes %  8.5 


 


Eosinophils %  3.4 


 


Basophils %  0.8 


 


Absolute Neutrophils  2.3 


 


Absolute Lymphocytes  2.8 


 


Absolute Monocytes  0.5 


 


Absolute Eosinophils  0.2 


 


Absolute Basophils  0.0 


 


Sodium   140.9


 


Potassium   4.3


 


Chloride   108 H


 


Carbon Dioxide   26


 


Anion Gap   7


 


BUN   10


 


Creatinine   0.82


 


Est GFR ( Amer)   > 60


 


Est GFR (Non-Af Amer)   > 60


 


Glucose   91


 


Calcium   9.3


 


Magnesium   2.1














Assessment & Plan





- Diagnosis


(1) Depression


Qualifiers: 


   Depression Type: unspecified   Qualified Code(s): F32.9 - Major depressive 

disorder, single episode, unspecified   


Is this a current diagnosis for this admission?: Yes   


Plan: 


1/23/2019-patient was admitted with a depression with intentional intake of 30 

tablets of Wellbutrin.  She was under IVC.  A consult was requested.





1/24/2019-patient was admitted for depression and she took 30 tablets of 

Wellbutrin intentionally before that she said she has 4 glasses of wine.  But 

alcohol level is here is 248.  Patient has been told the nurses that if she 

drinks very heavily almost on daily basis.  I am concerned about DTs.





1/25/2019-this 23-year-old female admitted with intentional intake of 30 tablets

of Wellbutrin.  She was under involuntary commitment.   told the nurses 

that patient is a heavy drinker.  Be watching for the DTs.  Patient denies any 

signs of depression today.  Psych recommended to start on Zyprexa 5 mg in the 

morning 2.5 mg in the evening and Cogentin 1 mg daily.  Plan is to see the 

response to these medications.





1/26/2019 patient has history of depression she is on Wellbutrin she is off the 

Wellbutrin now because she took more than 30 tablets of it at home no evidence 

of any anxiety depression during the hospital stay.





























(2) Suicidal ideation


Is this a current diagnosis for this admission?: Yes   


Plan: 


1/23/2019 patient was admitted after intentional intake of 30 tablets of 

Wellbutrin.  Psych consult was requested, involuntary commitment papers were 

done.





1/24/2019 patient denies any suicidal ideation or thoughts.  She took the 

Wellbutrin intentionally as per the patient.





1/25/2019-patient's is insisting that she denies any sort of  suicidal thoughts 

or ideation.  She is insisting that she took the Wellbutrin intentionally.  She 

is off Wellbutrin since the admission.  Patient denies any withdrawal effects.








1/26/2019-patient denies any suicidal thoughts or ideation.  She is adamant that

she took Wellbutrin intentionally.








(3) Intentional overdose of drug in tablet form


Is this a current diagnosis for this admission?: Yes   


Plan: 


1/23/2019-patient took 30 tablets of Wellbutrin intentionally.  Psych consult 

was requested their input is will be appreciated.  Patient is under IVC.





1/24/2019-intentional overdose of Wellbutrin medication.  She is doing much 

better.  No complications during the hospital stay.





1/25/2019-patient is doing much better.  Denies any anxiety depression, suicidal

thoughts or ideation.





1/26/2019-patient was admitted with intentional overdose of Wellbutrin.








(4) Tachycardia


Is this a current diagnosis for this admission?: Yes   


Plan: 


1/24/2019-she is having sinus tachycardia occasionally heart rate is going up to

140.  Patient is asymptomatic.  I am going to arrange for twelve-lead EKG.





1/25/2019 patient is occasionally tachycardic heart rate is going up to 120s 

whenever she gets anxiety spells.  We did twelve-lead EKG shows sinus rhythum.





1/26/2019 patient is tachycardic with minimal activity heart rate is going up to

140s 150s.  On planning to start her on metoprolol low-dose 12.5 mg p.o. twice a

day.
































(5) Alcohol use


Is this a current diagnosis for this admission?: Yes   


Plan: 


Alcohol level yesterday is 248.  Patient's  informed the nurses that 

patient is a drinks heavily on daily basis.  plan to watch for DTs while she was

in the hospital.








1/26/2018-patient has history of heavy alcohol use we are watching her for DTs 

so far no signs of any withdrawal.  Psych team is also following the patient.








- Time


Time Spent with patient: 15-24 minutes


Smoking Cessation Education: over 10 minutes


Medications reviewed and adjusted accordingly: Yes


Anticipated discharge: Home

## 2019-02-14 ENCOUNTER — HOSPITAL ENCOUNTER (EMERGENCY)
Dept: HOSPITAL 62 - ER | Age: 24
Discharge: HOME | End: 2019-02-14
Payer: COMMERCIAL

## 2019-02-14 VITALS — DIASTOLIC BLOOD PRESSURE: 75 MMHG | SYSTOLIC BLOOD PRESSURE: 123 MMHG

## 2019-02-14 DIAGNOSIS — F32.9: ICD-10-CM

## 2019-02-14 DIAGNOSIS — Z79.899: ICD-10-CM

## 2019-02-14 DIAGNOSIS — R00.2: Primary | ICD-10-CM

## 2019-02-14 DIAGNOSIS — R07.89: ICD-10-CM

## 2019-02-14 DIAGNOSIS — Z88.0: ICD-10-CM

## 2019-02-14 DIAGNOSIS — Z97.5: ICD-10-CM

## 2019-02-14 LAB
ADD MANUAL DIFF: NO
ALBUMIN SERPL-MCNC: 4.7 G/DL (ref 3.5–5)
ALP SERPL-CCNC: 61 U/L (ref 38–126)
ALT SERPL-CCNC: 217 U/L (ref 9–52)
ANION GAP SERPL CALC-SCNC: 11 MMOL/L (ref 5–19)
APAP SERPL-MCNC: < 10 UG/ML (ref 10–30)
APPEARANCE UR: CLEAR
APTT PPP: (no result) S
AST SERPL-CCNC: 91 U/L (ref 14–36)
BARBITURATES UR QL SCN: NEGATIVE
BASOPHILS # BLD AUTO: 0 10^3/UL (ref 0–0.2)
BASOPHILS NFR BLD AUTO: 0.6 % (ref 0–2)
BILIRUB DIRECT SERPL-MCNC: 0.2 MG/DL (ref 0–0.4)
BILIRUB SERPL-MCNC: 0.3 MG/DL (ref 0.2–1.3)
BILIRUB UR QL STRIP: NEGATIVE
BUN SERPL-MCNC: 13 MG/DL (ref 7–20)
CALCIUM: 9.3 MG/DL (ref 8.4–10.2)
CHLORIDE SERPL-SCNC: 106 MMOL/L (ref 98–107)
CO2 SERPL-SCNC: 25 MMOL/L (ref 22–30)
EOSINOPHIL # BLD AUTO: 0.2 10^3/UL (ref 0–0.6)
EOSINOPHIL NFR BLD AUTO: 3.1 % (ref 0–6)
ERYTHROCYTE [DISTWIDTH] IN BLOOD BY AUTOMATED COUNT: 12.9 % (ref 11.5–14)
ETHANOL SERPL-MCNC: < 10 MG/DL
GLUCOSE SERPL-MCNC: 102 MG/DL (ref 75–110)
GLUCOSE UR STRIP-MCNC: NEGATIVE MG/DL
HCT VFR BLD CALC: 39 % (ref 36–47)
HGB BLD-MCNC: 13.2 G/DL (ref 12–15.5)
KETONES UR STRIP-MCNC: NEGATIVE MG/DL
LYMPHOCYTES # BLD AUTO: 1.9 10^3/UL (ref 0.5–4.7)
LYMPHOCYTES NFR BLD AUTO: 29.1 % (ref 13–45)
MCH RBC QN AUTO: 30 PG (ref 27–33.4)
MCHC RBC AUTO-ENTMCNC: 33.7 G/DL (ref 32–36)
MCV RBC AUTO: 89 FL (ref 80–97)
METHADONE UR QL SCN: NEGATIVE
MONOCYTES # BLD AUTO: 0.4 10^3/UL (ref 0.1–1.4)
MONOCYTES NFR BLD AUTO: 6.8 % (ref 3–13)
NEUTROPHILS # BLD AUTO: 4 10^3/UL (ref 1.7–8.2)
NEUTS SEG NFR BLD AUTO: 60.4 % (ref 42–78)
NITRITE UR QL STRIP: NEGATIVE
PCP UR QL SCN: NEGATIVE
PH UR STRIP: 5 [PH] (ref 5–9)
PLATELET # BLD: 254 10^3/UL (ref 150–450)
POTASSIUM SERPL-SCNC: 4.6 MMOL/L (ref 3.6–5)
PROT SERPL-MCNC: 7.5 G/DL (ref 6.3–8.2)
PROT UR STRIP-MCNC: NEGATIVE MG/DL
RBC # BLD AUTO: 4.39 10^6/UL (ref 3.72–5.28)
SALICYLATES SERPL-MCNC: < 1 MG/DL (ref 2–20)
SODIUM SERPL-SCNC: 141.7 MMOL/L (ref 137–145)
SP GR UR STRIP: 1.01
TOTAL CELLS COUNTED % (AUTO): 100 %
URINE AMPHETAMINES SCREEN: NEGATIVE
URINE BENZODIAZEPINES SCREEN: NEGATIVE
URINE COCAINE SCREEN: NEGATIVE
URINE MARIJUANA (THC) SCREEN: NEGATIVE
UROBILINOGEN UR-MCNC: NEGATIVE MG/DL (ref ?–2)
WBC # BLD AUTO: 6.6 10^3/UL (ref 4–10.5)

## 2019-02-14 PROCEDURE — 80053 COMPREHEN METABOLIC PANEL: CPT

## 2019-02-14 PROCEDURE — 85025 COMPLETE CBC W/AUTO DIFF WBC: CPT

## 2019-02-14 PROCEDURE — 93005 ELECTROCARDIOGRAM TRACING: CPT

## 2019-02-14 PROCEDURE — 36415 COLL VENOUS BLD VENIPUNCTURE: CPT

## 2019-02-14 PROCEDURE — 81001 URINALYSIS AUTO W/SCOPE: CPT

## 2019-02-14 PROCEDURE — 85379 FIBRIN DEGRADATION QUANT: CPT

## 2019-02-14 PROCEDURE — 84484 ASSAY OF TROPONIN QUANT: CPT

## 2019-02-14 PROCEDURE — 93010 ELECTROCARDIOGRAM REPORT: CPT

## 2019-02-14 PROCEDURE — 99285 EMERGENCY DEPT VISIT HI MDM: CPT

## 2019-02-14 PROCEDURE — 71045 X-RAY EXAM CHEST 1 VIEW: CPT

## 2019-02-14 PROCEDURE — 80307 DRUG TEST PRSMV CHEM ANLYZR: CPT

## 2019-02-14 NOTE — PSYCHOLOGICAL NOTE
Psych Note





- Psych Note


Date seen by psych provider: 02/14/19


Time seen by psych provider: 14:55


Psych Note: 


Reason for Consult: medication recommendations





Patient is a 23-year-old female with a history of alcohol abuse and major 

depressive disorder who presents to the emergency department complaining of 

intermittent chest pain and palpitations over the last several weeks after being

started on several psychiatric medications.  





Patient reports that she is doing really well and that feels the medication is 

working however has been having some side effects from them.  She reports that 

she was told by her primary care physician that she was tachycardic however now 

is currently just heart palpitations.  She reports that her primary care 

provider told her to go to the emergency department if it occurred again so she 

came here for evaluation.  She reports that her primary care provider was 

worried that it might be her psychiatric medications and he did not feel 

comfortable making any adjustments.  She continued reports she has noticed that 

she also has gained 10 pounds however reports "you cannot tell" so has not 

concerned about that.  





Patient is alert and orientated to person, place, time and circumstance.  Mood 

is euthymic with congruent affect as evidenced by smiling engaging with 

clinician.  Patient denies suicidal and homicidal ideation.  Delusions are 

absent behaviors congruent with an intact reality based presentation i.e. 

organized and linear thought process.  Eye contact is well-maintained.  

Conversational speech is within normal rate, tone and prosody.  Intellectual 

abilities appear to be within the average range.  Attention and concentration 

are currently good.  Insight, judgment, impulse control are currently good.





Medication recommendations per [facility]'s contracted psychiatrist Dr Waaqr FLORES are

as follows


Please discontinue effexor and buspar


Continue Zyprexa 5mg every morning and 2.5mg every evening


Continue Congentin 1mg daily





296.30 (F33.9) Major depressive disorder per history report by patient


291.9 (F10.99) Unspecified substance abuse;alcohol


R/O Bipolar disorder


R/O Borderline Personality Disorder-patient has demonstrated cluster B 

personality traits during previous evaluations.





Impression/plan: Patient is cleared from acute psychiatric services.  Patient 

reports feeling much better since being on the medications however is 

experiencing some side effects that is affecting her medically.  She came in at 

the direction of her primary care provider because of her heart rate being so 

erratic.  She reports that she has gained approximately 10 pounds however 

reports she is unconcerned about the weight gain just the medical issues with 

her heart.  Medication recommendations have been provided.  Dr. Anaya was 

consulted and the care management this patient; attending physicians in 

agreement with recommendations and disposition.

## 2019-02-14 NOTE — ER DOCUMENT REPORT
ED Medical Screen (RME)





- General


Chief Complaint: Palpitations


Stated Complaint: CHEST PAIN


Time Seen by Provider: 02/14/19 12:23


Notes: 





While having chest pain and shortness of breath after taking multiple 

antipsychotic and antidepressant medications prescribed by the psychiatrist.


TRAVEL OUTSIDE OF THE [place] IN LAST 30 DAYS: No





- Related Data


Allergies/Adverse Reactions: 


                                        





amoxicillin Allergy (Severe, Verified 02/14/19 12:01)


   Unknown reaction











Past Medical History





- Past Medical History


Cardiac Medical History: 


   Denies: Hx Coronary Artery Disease, Hx DVT, Hx Hypercholesterolemia, Hx 

Hypertension, Hx Pulmonary Embolism


Pulmonary Medical History: 


   Denies: Hx Asthma, Hx Bronchitis, Hx Pneumonia


Neurological Medical History: Denies: Hx Seizures


Endocrine Medical History: Denies: Hx Diabetes Mellitus Type 1, Hx Hyperthyroidi

sm, Hx Hypothyroidism


Renal/ Medical History: Denies: Hx Peritoneal Dialysis


GI Medical History: Denies: Hx Cirrhosis, Hx Crohn's Disease, Hx Hepatitis, Hx 

Ulcerative Colitis


Musculoskeltal Medical History: Denies Hx Arthritis, Denies Hx Fibromyalgia


Skin Medical History: Denies Hx Eczema, Denies Hx Psoriasis


Psychiatric Medical History: Reports: Hx Depression


Infectious Medical History: Denies: Hx Hepatitis





- Immunizations


Immunizations up to date: Yes


Hx Diphtheria, Pertussis, Tetanus Vaccination: Yes





Physical Exam





- Vital signs


Vitals: 





                                        











Temp Pulse Resp BP Pulse Ox


 


 98.5 F   105 H  17   124/75   97 


 


 02/14/19 12:11  02/14/19 12:11  02/14/19 12:11  02/14/19 12:11  02/14/19 12:11














Course





- Vital Signs


Vital signs: 





                                        











Temp Pulse Resp BP Pulse Ox


 


 98.5 F   105 H  17   124/75   97 


 


 02/14/19 12:11  02/14/19 12:11  02/14/19 12:11  02/14/19 12:11  02/14/19 12:11

## 2019-02-14 NOTE — ER DOCUMENT REPORT
ED General





- General


Chief Complaint: Palpitations


Stated Complaint: CHEST PAIN


Time Seen by Provider: 02/14/19 12:23


TRAVEL OUTSIDE OF THE [place] IN LAST 30 DAYS: No





- HPI


Notes: 





Patient is a 23-year-old female with a history of alcohol abuse and major 

depressive disorder who presents to the emergency department complaining of 

intermittent chest pain and palpitations over the last several weeks after being

started on several psychiatric medications.  Patient states that she is eating 

and drinking without difficulty.  The pain in her chest sometimes feels like a 

burn which she believes may be acid reflux.  She is urinating normally and 

having normal bowel movements.  Denies IV drug abuse.  Patient has not had any 

SI/HI.  No visual or auditory hallucinations.  She otherwise feels well at this 

time.  She has been evaluated by her family doctor in the interim and he placed 

a referral to a cardiologist. Currently asymptomatic.  Pt does have a nexplanon 

implant.  Denies any prolonged immobilization, distance travel, recent 

surgery/trauma, personal cancer history, or previous DVT/PE.  Denies any 

headache, fever, neck pain, URI, sore throat, syncope, cough, shortness of 

breath, wheeze, dyspnea, abdominal pain, nausea/vomiting/diarrhea, urinary 

retention, dysuria, hematuria, loss of control of bowel or bladder, 

numbness/tingling, muscle paralysis/weakness, or rash.





- Related Data


Allergies/Adverse Reactions: 


                                        





amoxicillin Allergy (Severe, Verified 02/14/19 12:01)


   Unknown reaction











Past Medical History





- Social History


Smoking Status: Unknown if Ever Smoked


Chew tobacco use (# tins/day): No


Frequency of alcohol use: Occasional


Drug Abuse: None


Family History: denies: CAD, DM, Hypertension, Malignancy


Patient has suicidal ideation: No


Patient has homicidal ideation: No





- Past Medical History


Cardiac Medical History: 


   Denies: Hx Coronary Artery Disease, Hx DVT, Hx Hypercholesterolemia, Hx 

Hypertension, Hx Pulmonary Embolism


Pulmonary Medical History: 


   Denies: Hx Asthma, Hx Bronchitis, Hx Pneumonia


Neurological Medical History: Denies: Hx Seizures


Endocrine Medical History: Denies: Hx Diabetes Mellitus Type 1, Hx 

Hyperthyroidism, Hx Hypothyroidism


Renal/ Medical History: Denies: Hx Peritoneal Dialysis


GI Medical History: Denies: Hx Cirrhosis, Hx Crohn's Disease, Hx Hepatitis, Hx 

Ulcerative Colitis


Musculoskeletal Medical History: Denies Hx Arthritis, Denies Hx Fibromyalgia


Skin Medical History: Denies Hx Eczema, Denies Hx Psoriasis


Psychiatric Medical History: Reports: Hx Depression


Infectious Medical History: Denies: Hx Hepatitis





- Immunizations


Immunizations up to date: Yes


Hx Diphtheria, Pertussis, Tetanus Vaccination: Yes





Review of Systems





- Review of Systems


-: Yes All other systems reviewed and negative





Physical Exam





- Vital signs


Vitals: 


                                        











Temp Pulse Resp BP Pulse Ox


 


 98.5 F   105 H  17   124/75   97 


 


 02/14/19 12:11  02/14/19 12:11  02/14/19 12:11  02/14/19 12:11  02/14/19 12:11














- Notes


Notes: 





PHYSICAL EXAMINATION:





GENERAL: Well-appearing, well-nourished and in no acute distress.  A&Ox4.  

Answers questions appropriately.





HEAD: Atraumatic, normocephalic.





EYES: Pupils equal round and reactive to light, extraocular movements intact, 

sclera anicteric, conjunctiva are normal.





ENT:  Nares patent and without discharge.  oropharynx clear without exudates.  

No tonsilar hypertrophy or erythema.  Moist mucous membranes. 





NECK: Normal range of motion, supple without lymphadenopathy





LUNGS: Breath sounds clear to auscultation bilaterally and equal.  No wheezes 

rales or rhonchi.





HEART: Regular rate and rhythm without murmurs, rubs, gallops.





ABDOMEN: Soft, nontender, nondistended abdomen.  No guarding, no rebound.  No 

masses appreciated.  Normal bowel sounds present.  No CVA tenderness 

bilaterally.





Musculoskeletal: FROM to passive/active. Strength 5+/5. Charly neg.  No asymmetry

to LE's.





Extremities:  No cyanosis, clubbing, or edema b/l.  Peripheral pulses 2+.  

Capillary refill less than 3 seconds.





NEUROLOGICAL: Normal speech, normal gait.  





PSYCH: Normal mood, normal affect.





SKIN: Warm, Dry, normal turgor, no rashes or lesions noted.





Course





- Re-evaluation


Re-evalutation: 





02/14/19 15:17


Patient is an afebrile, well-hydrated 23-year-old female who presents to the ED 

with palpitations and atypical cp.  Vitals are acceptable without any 

significant tachycardia, tachypnea, or hypoxia.  PE is otherwise unremarkable.  

Patient is nontoxic-appearing and is tolerating p.o. without any difficulties.  

Pt is currently asymptomatic.  CBC, CMP, EKG/cardiac enzymes, d-dimer, chest x-

ray are all unremarkable for any acute pathology.  Patient has a heart score of 

1 for family history.  Patient does not have any chest pain, dyspnea, or 

shortness of breath. Orthostatics negative.  Patient's presentation and 

symptomatology creates low suspicion for ACS, PE, pneumothorax, pericarditis, 

dissection, respiratory compromise, severe dehydration, sepsis, meningitis, or 

other systemic emergent condition at this time.  Patient is aware that this 

condition can change from initial presentation and she needs to monitor symptoms

closely and seek medical attention for any acute changes.  Pt was seen by our P

sychology team who made their med recommendations as her symptoms began after 

starting her meds from her last visit (see below).  Pt is feeling better and 

would like to go home.  Recommend conservative measures for symptoms.  Recheck 

with your PCM in 2-3 days. Consider consult with Cardiology.  Return to the ED 

with any worsening/concerning symptoms otherwise as reviewed in discharge.  

Patient is in agreement.





Per Psych:


Medication recommendations per [facility]'s contracted psychiatrist Dr Waqar FLORES are

as follows


Please discontinue effexor and buspar


Continue Zyprexa 5mg every morning and 2.5mg every evening


Continue Congentin 1mg daily





- Vital Signs


Vital signs: 


                                        











Temp Pulse Resp BP Pulse Ox


 


 98.5 F   94   13   110/71   98 


 


 02/14/19 12:11  02/14/19 15:11  02/14/19 13:20  02/14/19 15:11  02/14/19 13:38














- Laboratory


Result Diagrams: 


                                 02/14/19 12:38





                                 02/14/19 12:38


Laboratory results interpreted by me: 


                                        











  02/14/19 02/14/19





  12:38 12:38


 


AST  91 H 


 


ALT  217 H 


 


Urine Ascorbic Acid   40 H


 


Salicylates  < 1.0 L 


 


Acetaminophen  < 10 L 














Discharge





- Discharge


Clinical Impression: 


 Heart palpitations





Condition: Stable


Disposition: HOME, SELF-CARE


Additional Instructions: 


Maintain adequate fluid and food intake


Take home medications as directed


Healthy diet/exercise


Monitor blood pressure daily and keep a log


Monitor symptoms for any acute changes


Recheck with your PCM in 2-3 days


Consider a follow-up with cardiology 


Return to the ED with any worsening symptoms and/or development of fever, 

headache, chest pain, palpitations, syncope, shortness of breath, trouble 

breathing, abdominal pain, n/v/d, blood in stool/urine, loss of control of 

bowel/bladder, urinary retention, muscle weakness/paralysis, numbness/tingling, 

or other worsening symptoms that are concerning to you.





Per our Psychology team:


Medication recommendations per [facility]'s contracted psychiatrist Dr Waqar FLORES are

as follows


Please discontinue effexor and buspar


Continue Zyprexa 5mg every morning and 2.5mg every evening


Continue Congentin 1mg daily


Referrals: 


MICHELLE COYLE MD [Primary Care Provider] - 02/18/19


AMERICA GRUBBS MD [ACTIVE STAFF] - Follow up as needed

## 2019-02-14 NOTE — RADIOLOGY REPORT (SQ)
EXAM DESCRIPTION:  CHEST SINGLE VIEW



COMPLETED DATE/TIME:  2/14/2019 1:07 pm



REASON FOR STUDY:  cp



COMPARISON:  None.



EXAM PARAMETERS:  NUMBER OF VIEWS: One view.

TECHNIQUE: Single frontal radiographic view of the chest acquired.

RADIATION DOSE: NA

LIMITATIONS: None.



FINDINGS:  LUNGS AND PLEURA: No opacities, masses or pneumothorax. No pleural effusion.

MEDIASTINUM AND HILAR STRUCTURES: No masses.  Contour normal.

HEART AND VASCULAR STRUCTURES: Heart normal in size.  Normal vasculature.

BONES: No acute findings.

HARDWARE: None in the chest.

OTHER: No other significant finding.



IMPRESSION:  NO ACUTE RADIOGRAPHIC FINDING IN THE CHEST.



TECHNICAL DOCUMENTATION:  JOB ID:  [id number]

 2011 [employer]- All Rights Reserved



Reading location - IP/workstation name: [email or web address]

## 2019-03-18 ENCOUNTER — HOSPITAL ENCOUNTER (OUTPATIENT)
Dept: HOSPITAL 62 - LAB | Age: 24
End: 2019-03-18
Attending: INTERNAL MEDICINE
Payer: COMMERCIAL

## 2019-03-18 DIAGNOSIS — R00.2: Primary | ICD-10-CM

## 2019-03-18 DIAGNOSIS — R07.9: ICD-10-CM

## 2019-03-18 LAB
ANION GAP SERPL CALC-SCNC: 8 MMOL/L (ref 5–19)
BUN SERPL-MCNC: 17 MG/DL (ref 7–20)
CALCIUM: 9.5 MG/DL (ref 8.4–10.2)
CHLORIDE SERPL-SCNC: 105 MMOL/L (ref 98–107)
CO2 SERPL-SCNC: 25 MMOL/L (ref 22–30)
GLUCOSE SERPL-MCNC: 95 MG/DL (ref 75–110)
POTASSIUM SERPL-SCNC: 4.4 MMOL/L (ref 3.6–5)
SODIUM SERPL-SCNC: 138.4 MMOL/L (ref 137–145)

## 2019-03-18 PROCEDURE — 83735 ASSAY OF MAGNESIUM: CPT

## 2019-03-18 PROCEDURE — 36415 COLL VENOUS BLD VENIPUNCTURE: CPT

## 2019-03-18 PROCEDURE — 80048 BASIC METABOLIC PNL TOTAL CA: CPT

## 2019-03-18 PROCEDURE — 84443 ASSAY THYROID STIM HORMONE: CPT

## 2019-09-12 ENCOUNTER — HOSPITAL ENCOUNTER (EMERGENCY)
Dept: HOSPITAL 62 - ER | Age: 24
LOS: 1 days | Discharge: HOME | End: 2019-09-13
Payer: COMMERCIAL

## 2019-09-12 DIAGNOSIS — F17.200: ICD-10-CM

## 2019-09-12 DIAGNOSIS — F10.129: ICD-10-CM

## 2019-09-12 DIAGNOSIS — R25.1: Primary | ICD-10-CM

## 2019-09-12 DIAGNOSIS — Z88.0: ICD-10-CM

## 2019-09-12 PROCEDURE — 81001 URINALYSIS AUTO W/SCOPE: CPT

## 2019-09-12 PROCEDURE — 36415 COLL VENOUS BLD VENIPUNCTURE: CPT

## 2019-09-12 PROCEDURE — 80053 COMPREHEN METABOLIC PANEL: CPT

## 2019-09-12 PROCEDURE — 96360 HYDRATION IV INFUSION INIT: CPT

## 2019-09-12 PROCEDURE — 84703 CHORIONIC GONADOTROPIN ASSAY: CPT

## 2019-09-12 PROCEDURE — 99284 EMERGENCY DEPT VISIT MOD MDM: CPT

## 2019-09-12 PROCEDURE — 85025 COMPLETE CBC W/AUTO DIFF WBC: CPT

## 2019-09-12 PROCEDURE — 80307 DRUG TEST PRSMV CHEM ANLYZR: CPT

## 2019-09-12 NOTE — ER DOCUMENT REPORT
ED General





- General


Chief Complaint: Tremor


Stated Complaint: CONVULSIONS


Time Seen by Provider: 09/12/19 23:00





- HPI


Notes: 





Patient is a 24-year-old female who presents emergency department for evaluation

of convulsion like activity and twitching.  This is been ongoing intermittently 

since she was 10.  Only happened intermittently, lasted only several minutes.  

Said it lasted 2 hours.  The patient admits she was drinking margaritas.  She 

denies use of any other illicit drugs.  Patient significant other states she 

drinks at least 3 times a week, if not more.  The patient denies any suicidal or

homicidal ideation.  She states she had referral to neurology as a child.  She 

states she had an EEG and nothing was ever found.





- Related Data


Allergies/Adverse Reactions: 


                                        





amoxicillin Allergy (Verified 09/13/19 00:09)


   








Home Medications: Effexor





Past Medical History





- General


Information source: Patient





- Social History


Smoking Status: Current Every Day Smoker - Vaping


Frequency of alcohol use: Heavy


Drug Abuse: None


Family History: Reviewed & Not Pertinent


Psychiatric Medical History: Reports: Hx Depression





Review of Systems





- Review of Systems


Constitutional: No symptoms reported


EENT: No symptoms reported


Cardiovascular: No symptoms reported


Respiratory: No symptoms reported


Gastrointestinal: No symptoms reported


Genitourinary: No symptoms reported


Musculoskeletal: No symptoms reported


Skin: No symptoms reported


Neurological/Psychological: See HPI





Physical Exam





- Vital signs


Vitals: 


                                        











Temp Pulse Resp BP Pulse Ox


 


 97.7 F   160 H  20   99/80 L  100 


 


 09/12/19 22:09  09/12/19 22:09  09/12/19 22:09  09/12/19 22:09  09/12/19 22:09














- Notes


Notes: 





Is a 24-year-old female who appears her stated age in no acute distress.  She is

laying on her back in a hallway bed.  She intermittently shows rhythmic type 

movements, nodding her head back and forth, shaking her shoulders back and 

forth.  Her hips and legs seem to make the similar motions.  She is awake and 

alert during these episodes.  She is able to talk and follow directions 

throughout.  Vital signs reviewed, please refer to chart. Head is normocephalic,

atraumatic.  Pupils equal round, reactive to light.  Neck is supple without 

meningismus.  Heart is regular rate and rhythm.  Lungs are clear to auscultation

bilaterally.  Abdomen is soft, nontender, normoactive bowel sounds throughout.  

Extremities without cyanosis, clubbing. Posterior calves are nontender.  

Peripheral pulses are equal.  Skin is warm and dry.  Patient is awake, alert, 

oriented x3.  Cranial nerves II - XII are grossly intact without focal 

neurological deficits.  Strength is plus 5 out of 5 bilateral upper and lower 

extremities.  Sensation is intact.  Reflexes symmetrical.  Intact 

finger-nose-finger, rapid alternating movements, heel-to-shin.





Course





- Re-evaluation


Re-evalutation: 





09/13/19 01:41


Patient presents emergency department for evaluation of convulsions/tremor.  She

has had an extensive work-up as a child and these are unremarkable.  These do 

not seem consistent with a full seizure at this time.  She is able to remain 

awake and alert, follow directions, and with concentration appears to be able to

stop them from happening.  I went back to reevaluate the patient and she was 

resting comfortably.  I spoke at length with her .  The patient has 

stopped seeing her therapist.  She is unhappy with the care of her psychiatrist.

 I do have strong suspicion for significant alcohol abuse at this time.  I 

strongly urged the patient's  to continue to convince her to seek out 

further psychological and psychiatric care.  She is encouraged to cut down on 

her alcohol consumption.  Otherwise, she should follow-up with primary care and 

psych, return to the ED with worsening or new concerning symptoms of any sort.





- Vital Signs


Vital signs: 


                                        











Temp Pulse Resp BP Pulse Ox


 


 97.7 F   160 H  20   99/80 L  100 


 


 09/12/19 22:09  09/12/19 22:09  09/12/19 22:09  09/12/19 22:09  09/12/19 22:09














- Laboratory


Result Diagrams: 


                                 09/12/19 00:04





                                 09/12/19 00:04


Laboratory results interpreted by me: 


                                        











  09/12/19





  00:04


 


Chloride  109 H


 


Carbon Dioxide  21 L


 


BUN  5 L


 


Total Bilirubin  0.1 L














Discharge





- Discharge


Clinical Impression: 


 Tremor, Alcohol abuse with intoxication





Condition: Stable


Disposition: HOME, SELF-CARE


Instructions:  Acute Alcohol Intoxication ([facility])


Additional Instructions: 


No clear cause was found for your symptoms today.  We strongly encourage you to 

abstain from heavy alcohol use.  I also encourage you to follow-up closely with 

psychiatry/psychology.  Return to the emergency department with worsening or new

concerning symptoms of any sort.

## 2019-09-13 VITALS — SYSTOLIC BLOOD PRESSURE: 95 MMHG | DIASTOLIC BLOOD PRESSURE: 58 MMHG

## 2019-09-13 LAB
ADD MANUAL DIFF: NO
ADD MANUAL MICROSCOPIC: YES
ALBUMIN SERPL-MCNC: 4.5 G/DL (ref 3.5–5)
ALP SERPL-CCNC: 63 U/L (ref 38–126)
ANION GAP SERPL CALC-SCNC: 15 MMOL/L (ref 5–19)
APPEARANCE UR: CLEAR
APTT PPP: COLORLESS S
AST SERPL-CCNC: 16 U/L (ref 14–36)
BARBITURATES UR QL SCN: NEGATIVE
BASOPHILS # BLD AUTO: 0 10^3/UL (ref 0–0.2)
BASOPHILS NFR BLD AUTO: 0.2 % (ref 0–2)
BILIRUB DIRECT SERPL-MCNC: 0.1 MG/DL (ref 0–0.4)
BILIRUB SERPL-MCNC: 0.1 MG/DL (ref 0.2–1.3)
BILIRUB UR QL STRIP: NEGATIVE
BUN SERPL-MCNC: 5 MG/DL (ref 7–20)
CALCIUM: 9.1 MG/DL (ref 8.4–10.2)
CHLORIDE SERPL-SCNC: 109 MMOL/L (ref 98–107)
CO2 SERPL-SCNC: 21 MMOL/L (ref 22–30)
EOSINOPHIL # BLD AUTO: 0.1 10^3/UL (ref 0–0.6)
EOSINOPHIL NFR BLD AUTO: 0.7 % (ref 0–6)
ERYTHROCYTE [DISTWIDTH] IN BLOOD BY AUTOMATED COUNT: 13.4 % (ref 11.5–14)
ETHANOL SERPL-MCNC: 263 MG/DL
GLUCOSE SERPL-MCNC: 105 MG/DL (ref 75–110)
GLUCOSE UR STRIP-MCNC: NEGATIVE MG/DL
HCT VFR BLD CALC: 40.2 % (ref 36–47)
HGB BLD-MCNC: 13.4 G/DL (ref 12–15.5)
KETONES UR STRIP-MCNC: NEGATIVE MG/DL
LYMPHOCYTES # BLD AUTO: 1.6 10^3/UL (ref 0.5–4.7)
LYMPHOCYTES NFR BLD AUTO: 19.8 % (ref 13–45)
MCH RBC QN AUTO: 29.7 PG (ref 27–33.4)
MCHC RBC AUTO-ENTMCNC: 33.3 G/DL (ref 32–36)
MCV RBC AUTO: 89 FL (ref 80–97)
METHADONE UR QL SCN: NEGATIVE
MONOCYTES # BLD AUTO: 0.4 10^3/UL (ref 0.1–1.4)
MONOCYTES NFR BLD AUTO: 5.5 % (ref 3–13)
NEUTROPHILS # BLD AUTO: 6 10^3/UL (ref 1.7–8.2)
NEUTS SEG NFR BLD AUTO: 73.8 % (ref 42–78)
NITRITE UR QL STRIP: NEGATIVE
PCP UR QL SCN: NEGATIVE
PH UR STRIP: 6 [PH] (ref 5–9)
PLATELET # BLD: 297 10^3/UL (ref 150–450)
POTASSIUM SERPL-SCNC: 4.1 MMOL/L (ref 3.6–5)
PROT SERPL-MCNC: 7.4 G/DL (ref 6.3–8.2)
PROT UR STRIP-MCNC: NEGATIVE MG/DL
RBC # BLD AUTO: 4.52 10^6/UL (ref 3.72–5.28)
SP GR UR STRIP: 1
TOTAL CELLS COUNTED % (AUTO): 100 %
URINE AMPHETAMINES SCREEN: NEGATIVE
URINE BENZODIAZEPINES SCREEN: NEGATIVE
URINE COCAINE SCREEN: NEGATIVE
URINE MARIJUANA (THC) SCREEN: NEGATIVE
UROBILINOGEN UR-MCNC: NEGATIVE MG/DL (ref ?–2)
WBC # BLD AUTO: 8.1 10^3/UL (ref 4–10.5)

## 2020-10-13 ENCOUNTER — HOSPITAL ENCOUNTER (EMERGENCY)
Dept: HOSPITAL 62 - ER | Age: 25
LOS: 1 days | Discharge: OTHER INSTITUTION | End: 2020-10-14
Payer: OTHER GOVERNMENT

## 2020-10-13 DIAGNOSIS — R11.10: ICD-10-CM

## 2020-10-13 DIAGNOSIS — Z88.0: ICD-10-CM

## 2020-10-13 DIAGNOSIS — F32.9: ICD-10-CM

## 2020-10-13 DIAGNOSIS — T42.6X2A: Primary | ICD-10-CM

## 2020-10-13 DIAGNOSIS — F17.210: ICD-10-CM

## 2020-10-13 DIAGNOSIS — R53.1: ICD-10-CM

## 2020-10-13 DIAGNOSIS — Z72.89: ICD-10-CM

## 2020-10-13 DIAGNOSIS — R53.81: ICD-10-CM

## 2020-10-13 LAB
ADD MANUAL DIFF: NO
ALBUMIN SERPL-MCNC: 4.8 G/DL (ref 3.5–5)
ALP SERPL-CCNC: 62 U/L (ref 38–126)
ANION GAP SERPL CALC-SCNC: 16 MMOL/L (ref 5–19)
APAP SERPL-MCNC: < 10 UG/ML (ref 10–30)
APPEARANCE UR: CLEAR
APTT PPP: YELLOW S
AST SERPL-CCNC: 14 U/L (ref 14–36)
BARBITURATES UR QL SCN: NEGATIVE
BASOPHILS # BLD AUTO: 0 10^3/UL (ref 0–0.2)
BASOPHILS NFR BLD AUTO: 0.3 % (ref 0–2)
BILIRUB DIRECT SERPL-MCNC: 0.3 MG/DL (ref 0–0.4)
BILIRUB SERPL-MCNC: 0.5 MG/DL (ref 0.2–1.3)
BILIRUB UR QL STRIP: NEGATIVE
BUN SERPL-MCNC: 7 MG/DL (ref 7–20)
CALCIUM: 8.9 MG/DL (ref 8.4–10.2)
CHLORIDE SERPL-SCNC: 107 MMOL/L (ref 98–107)
CO2 SERPL-SCNC: 21 MMOL/L (ref 22–30)
EOSINOPHIL # BLD AUTO: 0 10^3/UL (ref 0–0.6)
EOSINOPHIL NFR BLD AUTO: 0.6 % (ref 0–6)
ERYTHROCYTE [DISTWIDTH] IN BLOOD BY AUTOMATED COUNT: 12.7 % (ref 11.5–14)
ETHANOL SERPL-MCNC: 233 MG/DL
GLUCOSE SERPL-MCNC: 115 MG/DL (ref 75–110)
GLUCOSE UR STRIP-MCNC: NEGATIVE MG/DL
HCT VFR BLD CALC: 38.8 % (ref 36–47)
HGB BLD-MCNC: 13.4 G/DL (ref 12–15.5)
KETONES UR STRIP-MCNC: NEGATIVE MG/DL
LYMPHOCYTES # BLD AUTO: 1.8 10^3/UL (ref 0.5–4.7)
LYMPHOCYTES NFR BLD AUTO: 39.2 % (ref 13–45)
MCH RBC QN AUTO: 31.5 PG (ref 27–33.4)
MCHC RBC AUTO-ENTMCNC: 34.6 G/DL (ref 32–36)
MCV RBC AUTO: 91 FL (ref 80–97)
METHADONE UR QL SCN: NEGATIVE
MONOCYTES # BLD AUTO: 0.3 10^3/UL (ref 0.1–1.4)
MONOCYTES NFR BLD AUTO: 6.6 % (ref 3–13)
NEUTROPHILS # BLD AUTO: 2.4 10^3/UL (ref 1.7–8.2)
NEUTS SEG NFR BLD AUTO: 53.3 % (ref 42–78)
NITRITE UR QL STRIP: POSITIVE
PCP UR QL SCN: NEGATIVE
PH UR STRIP: 5 [PH] (ref 5–9)
PLATELET # BLD: 209 10^3/UL (ref 150–450)
POTASSIUM SERPL-SCNC: 4.1 MMOL/L (ref 3.6–5)
PROT SERPL-MCNC: 7.9 G/DL (ref 6.3–8.2)
PROT UR STRIP-MCNC: NEGATIVE MG/DL
RBC # BLD AUTO: 4.26 10^6/UL (ref 3.72–5.28)
SALICYLATES SERPL-MCNC: < 1 MG/DL (ref 2–20)
SP GR UR STRIP: 1.01
TOTAL CELLS COUNTED % (AUTO): 100 %
URINE AMPHETAMINES SCREEN: NEGATIVE
URINE BENZODIAZEPINES SCREEN: NEGATIVE
URINE COCAINE SCREEN: NEGATIVE
URINE MARIJUANA (THC) SCREEN: NEGATIVE
UROBILINOGEN UR-MCNC: NEGATIVE MG/DL (ref ?–2)
WBC # BLD AUTO: 4.6 10^3/UL (ref 4–10.5)

## 2020-10-13 PROCEDURE — 80307 DRUG TEST PRSMV CHEM ANLYZR: CPT

## 2020-10-13 PROCEDURE — 36415 COLL VENOUS BLD VENIPUNCTURE: CPT

## 2020-10-13 PROCEDURE — 93005 ELECTROCARDIOGRAM TRACING: CPT

## 2020-10-13 PROCEDURE — 93010 ELECTROCARDIOGRAM REPORT: CPT

## 2020-10-13 PROCEDURE — 81001 URINALYSIS AUTO W/SCOPE: CPT

## 2020-10-13 PROCEDURE — S0119 ONDANSETRON 4 MG: HCPCS

## 2020-10-13 PROCEDURE — 80053 COMPREHEN METABOLIC PANEL: CPT

## 2020-10-13 PROCEDURE — 85025 COMPLETE CBC W/AUTO DIFF WBC: CPT

## 2020-10-13 PROCEDURE — 87086 URINE CULTURE/COLONY COUNT: CPT

## 2020-10-13 PROCEDURE — 96360 HYDRATION IV INFUSION INIT: CPT

## 2020-10-13 PROCEDURE — 99285 EMERGENCY DEPT VISIT HI MDM: CPT

## 2020-10-13 RX ADMIN — SODIUM CHLORIDE PRN MLS/HR: 9 INJECTION, SOLUTION INTRAVENOUS at 10:12

## 2020-10-13 RX ADMIN — SODIUM CHLORIDE PRN MLS/HR: 9 INJECTION, SOLUTION INTRAVENOUS at 10:13

## 2020-10-13 NOTE — ER DOCUMENT REPORT
ED Psych Disorder / Suicide





- General


Chief Complaint: Psych Problem


Stated Complaint: POSSIBLE OVERDOSE


Time Seen by Provider: 10/13/20 02:10


Mode of Arrival: Medic


Information source: Patient, Emergency Med Personnel


Notes: 





25-year-old  female arrives by medics 3 with chief complaint of 

overdosing on 10 tablets of Lamictal 150 mg that she got filled on 9/20.  

Patient denies any suicidal ideation but took these pills on a spur of the 

moment thought.  She called her boyfriend [place] who advised he came over to 

her house and upon arriving she came down the stairs by herself.  She did vomit 

outside but there was no pills to be seen in her vomitus according to Barbara.  

Patient denies any chest pain denies any HI or SI patient is on Macrobid because

of a laceration to her right hand around the thenar web.  This appears to be 

self-induced.  Her vital signs are within normal limits except for some 

tachycardia around 108.  She appears to be sleepy.  She reports that her pills 

were taken around 2 hours prior to arrival and poison control was attempted to 

be notified but they were on hold.  Patient did report she was drinking some 

beer prior to the ingestion of the 10 tablets of Lamictal.  She denies any 

shortness of breath chest pain back pain extremity pain headache nuchal rigidity

sore throat vision problems coronavirus or flu virus exposure.  Prior history of

January 2019 overdose of bupropion.


TRAVEL OUTSIDE OF THE [place] IN LAST 30 DAYS: No





- HPI


Patient complains to provider of: Overdose


Onset: This evening


Onset was: Sudden


Quality of pain: No pain


Severity: None


Pain Level: Denies


Suicide Attempt Method: Overdose, Stabbing/Cutting.  denies: Drowning, Hanging, 

Motor Vehicle


Overdose of: No: Acetominophen, Alcohol, Anticholinergic - The mental side 

effects





- Related Data


Allergies/Adverse Reactions: 


                                        





amoxicillin Allergy (Verified 10/14/19 14:55)


   











Past Medical History





- General


Information source: Patient, Friend





- Social History


Smoking Status: Current Every Day Smoker


Cigarette use (# per day): Yes


Chew tobacco use (# tins/day): No


Smoking Education Provided: Yes


Frequency of alcohol use: Occasional


Drug Abuse: Prescription drugs


Lives with: Family


Family History: Reviewed & Not Pertinent


Patient has suicidal ideation: No


Patient has homicidal ideation: No





- Past Medical History


Cardiac Medical History: 


   Denies: Hx Coronary Artery Disease, Hx DVT, Hx Hypercholesterolemia, Hx 

Hypertension, Hx Pulmonary Embolism


Pulmonary Medical History: 


   Denies: Hx Asthma, Hx Bronchitis, Hx Pneumonia


Neurological Medical History: Denies: Hx Seizures


Endocrine Medical History: Denies: Hx Diabetes Mellitus Type 1, Hx Hyperthyr

oidism, Hx Hypothyroidism


Renal/ Medical History: Denies: Hx Peritoneal Dialysis


GI Medical History: Denies: Hx Cirrhosis, Hx Crohn's Disease, Hx Hepatitis, Hx 

Ulcerative Colitis


Musculoskeletal Medical History: Denies Hx Arthritis, Denies Hx Fibromyalgia, 

Reports Hx Musculoskeletal Deformity - Scoliosis


Skin Medical History: Denies Hx Eczema, Denies Hx Psoriasis


Psychiatric Medical History: Reports: Hx Bipolar Disorder, Hx Depression


Infectious Medical History: Denies: Hx Hepatitis


Past Surgical History: Reports: Hx Oral Surgery - Buda teeth





- Immunizations


Immunizations up to date: Yes


Hx Diphtheria, Pertussis, Tetanus Vaccination: Yes





Review of Systems





- Review of Systems


Constitutional: See HPI, Malaise, Weakness


EENT: No symptoms reported


Cardiovascular: No symptoms reported


Respiratory: No symptoms reported


Gastrointestinal: No symptoms reported


Genitourinary: No symptoms reported


Female Genitourinary: No symptoms reported


Musculoskeletal: No symptoms reported


Skin: No symptoms reported


Hematologic/Lymphatic: No symptoms reported


Neurological/Psychological: See HPI, Depression, Weakness





Physical Exam





- Vital signs


Vitals: 


                                        











Temp


 


 97.9 F 


 


 10/13/20 01:53











Interpretation: Normal





- General


General appearance: Appears well, Alert





- HEENT


Head: Normocephalic, Atraumatic


Eyes: Normal


Pupils: PERRL





- Respiratory


Respiratory status: No respiratory distress


Chest status: Nontender


Breath sounds: Normal


Chest palpation: Normal





- Cardiovascular


Rhythm: Regular


Heart sounds: Normal auscultation


Murmur: No





- Abdominal


Inspection: Normal


Distension: No distension


Bowel sounds: Normal


Tenderness: Nontender


Organomegaly: No organomegaly





- Rectal


Hemorrhoids: Other - deferred





- Genitourinary


Bimanuel exam: Other - deferred





- Back


Back: Normal, Nontender





- Extremities


General upper extremity: Normal inspection, Nontender, Normal color, Normal ROM,

Normal temperature


General lower extremity: Normal inspection, Nontender, Normal color, Normal ROM,

Normal temperature, Normal weight bearing.  No: Katy's sign





- Neurological


Neuro grossly intact: Yes


Cognition: Normal


Orientation: AAOx4


[place] Coma Scale Eye Opening: Spontaneous


[place] Coma Scale Verbal: Oriented


[place] Coma Scale Motor: Obeys Commands


[place] Coma Scale Total: 15


Speech: Normal


Motor strength normal: LUE, RUE, LLE, RLE


Sensory: Normal





- Psychological


Associated symptoms: Normal affect, Normal mood





- Skin


Skin Temperature: Warm


Skin Moisture: Dry


Skin Color: Normal





Course





- Vital Signs


Vital signs: 


                                        











Temp Pulse Resp BP Pulse Ox


 


 98.2 F      16   125/79   97 


 


 10/13/20 16:00     10/13/20 16:00  10/13/20 16:00  10/13/20 16:00














- Laboratory


Result Diagrams: 


                                 10/13/20 02:00





                                 10/13/20 02:00


Laboratory results interpreted by me: 


                                        











  10/13/20 10/13/20





  02:00 05:00


 


Carbon Dioxide  21 L 


 


Glucose  115 H 


 


Urine Nitrite   POSITIVE H


 


Ur Leukocyte Esterase   TRACE H


 


Salicylates  < 1.0 L 


 


Acetaminophen  < 10 L 














- EKG Interpretation by Me


EKG shows normal: Sinus rhythm


Rate: Normal


Rhythm: NSR





Discharge





- Discharge


Clinical Impression: 


 Intentional overdose of drug in tablet form, Alcohol use





Depression


Qualifiers:


 Depression Type: unspecified Qualified Code(s): F32.9 - Major depressive 

disorder, single episode, unspecified





Condition: Stable


Disposition: OTHER

## 2020-10-14 VITALS — DIASTOLIC BLOOD PRESSURE: 81 MMHG | SYSTOLIC BLOOD PRESSURE: 139 MMHG
